# Patient Record
Sex: FEMALE | Race: WHITE | Employment: FULL TIME | URBAN - METROPOLITAN AREA
[De-identification: names, ages, dates, MRNs, and addresses within clinical notes are randomized per-mention and may not be internally consistent; named-entity substitution may affect disease eponyms.]

---

## 2021-01-20 ENCOUNTER — TRANSFERRED RECORDS (OUTPATIENT)
Dept: HEALTH INFORMATION MANAGEMENT | Facility: CLINIC | Age: 61
End: 2021-01-20

## 2021-01-21 ENCOUNTER — TRANSFERRED RECORDS (OUTPATIENT)
Dept: HEALTH INFORMATION MANAGEMENT | Facility: CLINIC | Age: 61
End: 2021-01-21

## 2021-01-22 ENCOUNTER — TELEPHONE (OUTPATIENT)
Dept: MULTI SPECIALTY CLINIC | Facility: CLINIC | Age: 61
End: 2021-01-22

## 2021-01-22 ENCOUNTER — TRANSCRIBE ORDERS (OUTPATIENT)
Dept: OTHER | Age: 61
End: 2021-01-22

## 2021-01-22 ENCOUNTER — DOCUMENTATION ONLY (OUTPATIENT)
Dept: ONCOLOGY | Facility: CLINIC | Age: 61
End: 2021-01-22

## 2021-01-22 DIAGNOSIS — R91.8 LUNG MASS: Primary | ICD-10-CM

## 2021-01-22 DIAGNOSIS — R91.8 MASS OF UPPER LOBE OF RIGHT LUNG: Primary | ICD-10-CM

## 2021-01-22 NOTE — PROGRESS NOTES
Action    Action Taken 1/21/21:    -Spoke w/ [facility] Medical Records - [facility] is a [facility], however pt has not yet signed Care Everywhere Auth. They will fax over Office Notes, Rad reports & labs.    -Spoke w/ Kimberlee in [facility] Rad - they had a disc already burned to send to us, just needed address. Advised Kimberlee I could send a [employer] Shipping Label. Kimberlee advised me that they do not typically utilize [employer] & were unsure how to initiate . Kimberlee advised me they typically use [employer]. I Provided Kimberlee w/ our address,  (attn: to myself.) Kimberlee advised she would also give pt a copy of disc for appt.    -Imaging coming our way detailed below.    -UPS Tracking Number: [id number]  1:32 PM    -1/25/21: Imaging disc from [facility] rec'd - taken for upload  ,2:40 PM       RECORDS STATUS - ALL OTHER DIAGNOSIS      RECORDS RECEIVED FROM: [facility], [facility]   DATE RECEIVED:    NOTES STATUS DETAILS   OFFICE NOTE from referring provider Requested 1/21 Marlyn Arnett PA-C   OFFICE NOTE from medical oncologist     DISCHARGE SUMMARY from hospital     DISCHARGE REPORT from the ER     OPERATIVE REPORT     MEDICATION LIST     CLINICAL TRIAL TREATMENTS TO DATE     LABS     PATHOLOGY REPORTS     ANYTHING RELATED TO DIAGNOSIS Requested 1/21    GENONOMIC TESTING     TYPE:     IMAGING (NEED IMAGES & REPORT)     CT SCANS PACS 1/21/21: [facility]   MRI PACS 1/20/21, 1/16/21: [facility]   MAMMO     ULTRASOUND     PET Requested 2/5 2/4/21: [facility]

## 2021-01-22 NOTE — TELEPHONE ENCOUNTER
Paged by provider RIGOBERTO Reaves at [facility] in regards to this patient and new finding of RUL mass concerning for malignancy. Asking for assistance with coordinating Pulmonary appointment for biopsy evaluation. Patient lives 2.5 hours away so would like to get as much done as possible in as few visits as possible.     Messaged IP clinic schedulers about getting patient in ASAP. Have also ordered a PET scan (not yet done) to be done same day prior to IP visit.     Numbers for Marlyn Arnett:  Cell [phone]  [facility] office [phone]    More Beverly MD on 1/22/2021 at 12:46 PM

## 2021-01-27 ENCOUNTER — TELEPHONE (OUTPATIENT)
Dept: SURGERY | Facility: CLINIC | Age: 61
End: 2021-01-27

## 2021-01-27 NOTE — TELEPHONE ENCOUNTER
Called patient to see if she would like to have the PET Scan done near her. She would like the PET order sent to [facility] in [facility]. Will fax the order today to [phone].

## 2021-02-03 NOTE — TELEPHONE ENCOUNTER
ONCOLOGY INTAKE: Records Information      APPT INFORMATION:  Referring provider: Angeliot Arnett PA-C  Referring provider s clinic: [facility]  Reason for visit/diagnosis:Mass of upper lobe of right lung  Has patient been notified of appointment date and time?: Yes    RECORDS INFORMATION:  Were the records received with the referral (via [employer])? No    Has patient been seen for any external appt for this diagnosis? Yes    If yes, [facility]    Has patient had any imaging or procedures outside of [place] for this condition? Yes      If Yes, [facility]    ADDITIONAL INFORMATION:  PET scheduled for 2/4 with Timothy

## 2021-02-04 ENCOUNTER — TRANSFERRED RECORDS (OUTPATIENT)
Dept: HEALTH INFORMATION MANAGEMENT | Facility: CLINIC | Age: 61
End: 2021-02-04

## 2021-02-10 ENCOUNTER — VIRTUAL VISIT (OUTPATIENT)
Dept: PULMONOLOGY | Facility: CLINIC | Age: 61
End: 2021-02-10
Attending: INTERNAL MEDICINE
Payer: COMMERCIAL

## 2021-02-10 ENCOUNTER — PRE VISIT (OUTPATIENT)
Dept: PULMONOLOGY | Facility: CLINIC | Age: 61
End: 2021-02-10

## 2021-02-10 DIAGNOSIS — R91.8 LUNG MASS: Primary | ICD-10-CM

## 2021-02-10 DIAGNOSIS — C34.91 MALIGNANT NEOPLASM OF RIGHT LUNG, UNSPECIFIED PART OF LUNG (H): ICD-10-CM

## 2021-02-10 PROCEDURE — 99204 OFFICE O/P NEW MOD 45 MIN: CPT | Mod: 95 | Performed by: INTERNAL MEDICINE

## 2021-02-10 RX ORDER — METFORMIN HCL 500 MG
1000 TABLET, EXTENDED RELEASE 24 HR ORAL 2 TIMES DAILY WITH MEALS
COMMUNITY
Start: 2020-08-31 | End: 2021-08-31

## 2021-02-10 RX ORDER — BIOTIN 1 MG
TABLET ORAL
Status: ON HOLD | COMMUNITY
Start: 2019-05-14 | End: 2021-04-05

## 2021-02-10 RX ORDER — HYDROCODONE BITARTRATE AND ACETAMINOPHEN 5; 325 MG/1; MG/1
1 TABLET ORAL
Status: ON HOLD | COMMUNITY
Start: 2021-02-01 | End: 2021-03-17

## 2021-02-10 RX ORDER — ATORVASTATIN CALCIUM 40 MG/1
40 TABLET, FILM COATED ORAL DAILY
COMMUNITY
Start: 2020-09-02 | End: 2021-09-02

## 2021-02-10 RX ORDER — PEN NEEDLE, DIABETIC 31 GX5/16"
NEEDLE, DISPOSABLE MISCELLANEOUS
Status: ON HOLD | COMMUNITY
Start: 2019-05-14 | End: 2021-04-05

## 2021-02-10 RX ORDER — ACETAMINOPHEN 500 MG
500 TABLET ORAL
COMMUNITY
End: 2021-03-29

## 2021-02-10 RX ORDER — IBUPROFEN 200 MG
400 TABLET ORAL EVERY 6 HOURS PRN
COMMUNITY

## 2021-02-10 NOTE — PROGRESS NOTES
"Anya is a 60 year old who is being evaluated via a billable video visit.      How would you like to obtain your AVS? MyChart  If the video visit is dropped, the invitation should be resent by: Text to cell phone: [phone]  Will anyone else be joining your video visit? No      Vitals - Patient Reported  Weight (Patient Reported): 65.8 kg (145 lb)  Height (Patient Reported): 165.1 cm (5' 5\")  BMI (Based on Pt Reported Ht/Wt): 24.13  Pain Score: Moderate Pain (5)  Pain Loc: (PATIENT REPORTS PAIN RIGHT SIDE - UNDER ARM)      I have reviewed and updated patient's allergy and medication list.    Concerns: NONE  Refills: NONE      Mary Lou Ojeda CMA      Video-Visit Details    Type of service:  Video Visit    Video End Time 20 min    Originating Location (pt. Location): Home    Distant Location (provider location):  [facility]     Platform used for Video Visit: [facility]  Interventional Pulmonary Clinic Virtual Visit Note    February 10, 2021    Chief complaint:  Anya Mazariegos is a 60 year old female seen for   Chief Complaint   Patient presents with     Video Visit     RETURN;        Reason for clinic visit / Chief complaint:   Right upper lobe mass    Assessment and Plan:  Right upper lobe mass, likely suggesting primary lung cancer with local lymphangitic spread and questionable chest wall invasion.  PET scan is performed right hilar lymph nodes appear to be positive in my opinion.  We discussed her CT and PET scan findings and further diagnostic techniques, including CT-guided biopsy or bronchoscopic approach.  We will plan to do flexible bronchoscopy with endobronchial ultrasound-guided biopsies.  I discussed pros, cons and potential complications of the procedure and she is in agreement to proceed.  We will schedule her case as soon as possible.  I answered all her questions.    History of Present Illness:  This is a 60 years old woman with no known pulmonary problems " however lifelong smoker, 1 pack/day for 40+ years, no history of exposure.  She has no respiratory symptoms except for occasional cough with no phlegm production.  She did have general anesthesia in the past when she had appendectomy and broken bone surgery.  Her mother had COPD and she was a smoker as well.  She has diabetes which is insulin-dependent.       Allergies   Allergen Reactions     Erythromycin Unknown        Past medical: Insulin dependent DM    Past surgical: broken bone, appendectomy    Social History     Socioeconomic History     Marital status:      Spouse name: Not on file     Number of children: Not on file     Years of education: Not on file     Highest education level: Not on file   Occupational History     Not on file   Social Needs     Financial resource strain: Not on file     Food insecurity     Worry: Not on file     Inability: Not on file     Transportation needs     Medical: Not on file     Non-medical: Not on file   Tobacco Use     Smoking status: Not on file   Substance and Sexual Activity     Alcohol use: Not on file     Drug use: Not on file     Sexual activity: Not on file   Lifestyle     Physical activity     Days per week: Not on file     Minutes per session: Not on file     Stress: Not on file   Relationships     Social connections     Talks on phone: Not on file     Gets together: Not on file     Attends Scientology service: Not on file     Active member of club or organization: Not on file     Attends meetings of clubs or organizations: Not on file     Relationship status: Not on file     Intimate partner violence     Fear of current or ex partner: Not on file     Emotionally abused: Not on file     Physically abused: Not on file     Forced sexual activity: Not on file   Other Topics Concern     Not on file   Social History Narrative     Not on file        Family history: mother w copd      There is no immunization history on file for this patient.    Current Outpatient  Medications   Medication Sig     acetaminophen (TYLENOL) 500 MG tablet Take 500 mg by mouth     atorvastatin (LIPITOR) 40 MG tablet Take 40 mg by mouth     blood glucose (TRUETEST) test strip Test twice daily.     HYDROcodone-acetaminophen (NORCO) 5-325 MG tablet Take 1 tablet by mouth     ibuprofen (ADVIL/MOTRIN) 200 MG tablet Take 400 mg by mouth     insulin glargine (LANTUS SOLOSTAR) 100 UNIT/ML pen Inject 44 Units Subcutaneous     insulin pen needle (MM PEN NEEDLES) 31G X 8 MM miscellaneous Use twice daily.     metFORMIN (GLUCOPHAGE-XR) 500 MG 24 hr tablet Take 1,000 mg by mouth     Semaglutide,0.25 or 0.5MG/DOS, 2 MG/1.5ML SOPN Inject 0.5 mg Subcutaneous     No current facility-administered medications for this visit.         Review of Systems:  I have done 10 points of review systems and all negative except for those mentioned in HPI    Physical examination  Constitutional: Oriented, not in distress  Head and neck: normal posture and movements  Respiratory: Normal tidal breathing, no shortness of breath, no audible wheezing or stridor over the phone or video visit  Integumentary:  No rash on visible skin areas on video visit  Neurological: Alert, orientedx3, no motor deficits  Psychiatric:  Mood and affect are appropriate with insight into her medical condition        MAIN Cote MD

## 2021-02-10 NOTE — LETTER
Date:February 11, 2021      Patient was self referred, no letter generated. Do not send.        [facility] Health Information

## 2021-02-10 NOTE — LETTER
"2/10/2021       RE: Anya Mazariegos  [address]  [place] [facility] [address]     Dear Colleague,    Thank you for referring your patient, Anya Mazariegos, to the [facility] at [facility]. Please see a copy of my visit note below.    Anya is a 60 year old who is being evaluated via a billable video visit.      How would you like to obtain your AVS? MyChart  If the video visit is dropped, the invitation should be resent by: Text to cell phone: [phone]  Will anyone else be joining your video visit? No      Vitals - Patient Reported  Weight (Patient Reported): 65.8 kg (145 lb)  Height (Patient Reported): 165.1 cm (5' 5\")  BMI (Based on Pt Reported Ht/Wt): 24.13  Pain Score: Moderate Pain (5)  Pain Loc: (PATIENT REPORTS PAIN RIGHT SIDE - UNDER ARM)      I have reviewed and updated patient's allergy and medication list.    Concerns: NONE  Refills: NONE      Mary Lou Ojeda CMA      Video-Visit Details    Type of service:  Video Visit    Video End Time 20 min    Originating Location (pt. Location): Home    Distant Location (provider location):  [facility]ER [facility]     Platform used for Video Visit: [facility]  Interventional Pulmonary Clinic Virtual Visit Note    February 10, 2021    Chief complaint:  Anya Mazariegos is a 60 year old female seen for   Chief Complaint   Patient presents with     Video Visit     RETURN;        Reason for clinic visit / Chief complaint:   Right upper lobe mass    Assessment and Plan:  Right upper lobe mass, likely suggesting primary lung cancer with local lymphangitic spread and questionable chest wall invasion.  PET scan is performed right hilar lymph nodes appear to be positive in my opinion.  We discussed her CT and PET scan findings and further diagnostic techniques, including CT-guided biopsy or bronchoscopic approach.  We will plan to do flexible bronchoscopy with endobronchial " ultrasound-guided biopsies.  I discussed pros, cons and potential complications of the procedure and she is in agreement to proceed.  We will schedule her case as soon as possible.  I answered all her questions.    History of Present Illness:  This is a 60 years old woman with no known pulmonary problems however lifelong smoker, 1 pack/day for 40+ years, no history of exposure.  She has no respiratory symptoms except for occasional cough with no phlegm production.  She did have general anesthesia in the past when she had appendectomy and broken bone surgery.  Her mother had COPD and she was a smoker as well.  She has diabetes which is insulin-dependent.       Allergies   Allergen Reactions     Erythromycin Unknown        Past medical: Insulin dependent DM    Past surgical: broken bone, appendectomy    Social History     Socioeconomic History     Marital status:      Spouse name: Not on file     Number of children: Not on file     Years of education: Not on file     Highest education level: Not on file   Occupational History     Not on file   Social Needs     Financial resource strain: Not on file     Food insecurity     Worry: Not on file     Inability: Not on file     Transportation needs     Medical: Not on file     Non-medical: Not on file   Tobacco Use     Smoking status: Not on file   Substance and Sexual Activity     Alcohol use: Not on file     Drug use: Not on file     Sexual activity: Not on file   Lifestyle     Physical activity     Days per week: Not on file     Minutes per session: Not on file     Stress: Not on file   Relationships     Social connections     Talks on phone: Not on file     Gets together: Not on file     Attends Buddhist service: Not on file     Active member of club or organization: Not on file     Attends meetings of clubs or organizations: Not on file     Relationship status: Not on file     Intimate partner violence     Fear of current or ex partner: Not on file      Emotionally abused: Not on file     Physically abused: Not on file     Forced sexual activity: Not on file   Other Topics Concern     Not on file   Social History Narrative     Not on file        Family history: mother w copd      There is no immunization history on file for this patient.    Current Outpatient Medications   Medication Sig     acetaminophen (TYLENOL) 500 MG tablet Take 500 mg by mouth     atorvastatin (LIPITOR) 40 MG tablet Take 40 mg by mouth     blood glucose (TRUETEST) test strip Test twice daily.     HYDROcodone-acetaminophen (NORCO) 5-325 MG tablet Take 1 tablet by mouth     ibuprofen (ADVIL/MOTRIN) 200 MG tablet Take 400 mg by mouth     insulin glargine (LANTUS SOLOSTAR) 100 UNIT/ML pen Inject 44 Units Subcutaneous     insulin pen needle (MM PEN NEEDLES) 31G X 8 MM miscellaneous Use twice daily.     metFORMIN (GLUCOPHAGE-XR) 500 MG 24 hr tablet Take 1,000 mg by mouth     Semaglutide,0.25 or 0.5MG/DOS, 2 MG/1.5ML SOPN Inject 0.5 mg Subcutaneous     No current facility-administered medications for this visit.         Review of Systems:  I have done 10 points of review systems and all negative except for those mentioned in HPI    Physical examination  Constitutional: Oriented, not in distress  Head and neck: normal posture and movements  Respiratory: Normal tidal breathing, no shortness of breath, no audible wheezing or stridor over the phone or video visit  Integumentary:  No rash on visible skin areas on video visit  Neurological: Alert, orientedx3, no motor deficits  Psychiatric:  Mood and affect are appropriate with insight into her medical condition        MAIN Cote MD      Again, thank you for allowing me to participate in the care of your patient.      Sincerely,    Dominick Cote MD

## 2021-02-12 ENCOUNTER — TELEPHONE (OUTPATIENT)
Dept: PULMONOLOGY | Facility: CLINIC | Age: 61
End: 2021-02-12

## 2021-02-12 DIAGNOSIS — Z11.59 ENCOUNTER FOR SCREENING FOR OTHER VIRAL DISEASES: ICD-10-CM

## 2021-02-12 PROBLEM — R91.8 LUNG MASS: Status: ACTIVE | Noted: 2021-02-12

## 2021-02-12 NOTE — TELEPHONE ENCOUNTER
Spoke with patient to schedule procedure with Beto Garibay   Procedure was scheduled on 02/25 at [facility] OR  Patient will have H&P at [facility]     Patient is aware a COVID-19 test is needed before their procedure. The test should be with-in 4 days of their procedure.   Test Details: Date 02/22  Location [facility]    Patient is aware a / is needed day of surgery.   Surgery letter was sent via [employer], patient has my direct contact information for any further questions.

## 2021-02-23 ENCOUNTER — TRANSFERRED RECORDS (OUTPATIENT)
Dept: HEALTH INFORMATION MANAGEMENT | Facility: CLINIC | Age: 61
End: 2021-02-23

## 2021-02-23 LAB
CREAT SERPL-MCNC: 0.57 MG/DL (ref 0.6–1.2)
GFR SERPL CREATININE-BSD FRML MDRD: >60 ML/MIN/1.73M(2)
GLUCOSE SERPL-MCNC: 159 MG/DL (ref 70–105)
HBA1C MFR BLD: 8.6 % (ref 4.3–5.7)
POTASSIUM SERPL-SCNC: 4.5 MMOL/L (ref 3.5–5.1)

## 2021-02-24 ENCOUNTER — ANESTHESIA EVENT (OUTPATIENT)
Dept: SURGERY | Facility: CLINIC | Age: 61
End: 2021-02-24
Payer: COMMERCIAL

## 2021-02-25 ENCOUNTER — HOSPITAL ENCOUNTER (OUTPATIENT)
Facility: CLINIC | Age: 61
Discharge: HOME | End: 2021-02-25
Attending: INTERNAL MEDICINE | Admitting: INTERNAL MEDICINE
Payer: COMMERCIAL

## 2021-02-25 ENCOUNTER — ANESTHESIA (OUTPATIENT)
Dept: SURGERY | Facility: CLINIC | Age: 61
End: 2021-02-25
Payer: COMMERCIAL

## 2021-02-25 VITALS
DIASTOLIC BLOOD PRESSURE: 69 MMHG | TEMPERATURE: 98.3 F | BODY MASS INDEX: 23.14 KG/M2 | RESPIRATION RATE: 16 BRPM | HEART RATE: 74 BPM | HEIGHT: 65 IN | OXYGEN SATURATION: 90 % | SYSTOLIC BLOOD PRESSURE: 126 MMHG | WEIGHT: 138.89 LBS

## 2021-02-25 DIAGNOSIS — R91.8 LUNG MASS: ICD-10-CM

## 2021-02-25 LAB — GLUCOSE BLDC GLUCOMTR-MCNC: 124 MG/DL (ref 70–99)

## 2021-02-25 PROCEDURE — 710N000012 HC RECOVERY PHASE 2, PER MINUTE: Performed by: INTERNAL MEDICINE

## 2021-02-25 PROCEDURE — 88184 FLOWCYTOMETRY/ TC 1 MARKER: CPT | Mod: TC | Performed by: STUDENT IN AN ORGANIZED HEALTH CARE EDUCATION/TRAINING PROGRAM

## 2021-02-25 PROCEDURE — 272N000001 HC OR GENERAL SUPPLY STERILE: Performed by: INTERNAL MEDICINE

## 2021-02-25 PROCEDURE — 82962 GLUCOSE BLOOD TEST: CPT

## 2021-02-25 PROCEDURE — 250N000025 HC SEVOFLURANE, PER MIN: Performed by: INTERNAL MEDICINE

## 2021-02-25 PROCEDURE — 88342 IMHCHEM/IMCYTCHM 1ST ANTB: CPT | Mod: 26 | Performed by: PATHOLOGY

## 2021-02-25 PROCEDURE — 88172 CYTP DX EVAL FNA 1ST EA SITE: CPT | Mod: TC | Performed by: INTERNAL MEDICINE

## 2021-02-25 PROCEDURE — 88189 FLOWCYTOMETRY/READ 16 & >: CPT | Performed by: PATHOLOGY

## 2021-02-25 PROCEDURE — 88172 CYTP DX EVAL FNA 1ST EA SITE: CPT | Mod: 26 | Performed by: PATHOLOGY

## 2021-02-25 PROCEDURE — 88185 FLOWCYTOMETRY/TC ADD-ON: CPT | Mod: TC | Performed by: STUDENT IN AN ORGANIZED HEALTH CARE EDUCATION/TRAINING PROGRAM

## 2021-02-25 PROCEDURE — 88305 TISSUE EXAM BY PATHOLOGIST: CPT | Mod: TC | Performed by: INTERNAL MEDICINE

## 2021-02-25 PROCEDURE — 258N000003 HC RX IP 258 OP 636: Performed by: NURSE ANESTHETIST, CERTIFIED REGISTERED

## 2021-02-25 PROCEDURE — 88341 IMHCHEM/IMCYTCHM EA ADD ANTB: CPT | Mod: TC | Performed by: INTERNAL MEDICINE

## 2021-02-25 PROCEDURE — 88342 IMHCHEM/IMCYTCHM 1ST ANTB: CPT | Mod: TC | Performed by: INTERNAL MEDICINE

## 2021-02-25 PROCEDURE — 999N000141 HC STATISTIC PRE-PROCEDURE NURSING ASSESSMENT: Performed by: INTERNAL MEDICINE

## 2021-02-25 PROCEDURE — 88305 TISSUE EXAM BY PATHOLOGIST: CPT | Mod: 26 | Performed by: PATHOLOGY

## 2021-02-25 PROCEDURE — 999N001137 HC STATISTIC FLOW >15 ABY TC 88189: Performed by: STUDENT IN AN ORGANIZED HEALTH CARE EDUCATION/TRAINING PROGRAM

## 2021-02-25 PROCEDURE — 999N001018 HC STATISTIC H-CELL BLOCK W/STAIN: Performed by: INTERNAL MEDICINE

## 2021-02-25 PROCEDURE — 250N000009 HC RX 250: Performed by: NURSE ANESTHETIST, CERTIFIED REGISTERED

## 2021-02-25 PROCEDURE — 360N000083 HC SURGERY LEVEL 3 W/ FLUORO, PER MIN: Performed by: INTERNAL MEDICINE

## 2021-02-25 PROCEDURE — 88173 CYTOPATH EVAL FNA REPORT: CPT | Mod: TC | Performed by: INTERNAL MEDICINE

## 2021-02-25 PROCEDURE — 710N000010 HC RECOVERY PHASE 1, LEVEL 2, PER MIN: Performed by: INTERNAL MEDICINE

## 2021-02-25 PROCEDURE — 31653 BRONCH EBUS SAMPLNG 3/> NODE: CPT | Mod: GC | Performed by: INTERNAL MEDICINE

## 2021-02-25 PROCEDURE — 88173 CYTOPATH EVAL FNA REPORT: CPT | Mod: 26 | Performed by: PATHOLOGY

## 2021-02-25 PROCEDURE — 370N000017 HC ANESTHESIA TECHNICAL FEE, PER MIN: Performed by: INTERNAL MEDICINE

## 2021-02-25 PROCEDURE — 88341 IMHCHEM/IMCYTCHM EA ADD ANTB: CPT | Mod: 26 | Performed by: PATHOLOGY

## 2021-02-25 PROCEDURE — 250N000011 HC RX IP 250 OP 636: Performed by: NURSE ANESTHETIST, CERTIFIED REGISTERED

## 2021-02-25 RX ORDER — FENTANYL CITRATE 50 UG/ML
25-50 INJECTION, SOLUTION INTRAMUSCULAR; INTRAVENOUS
Status: DISCONTINUED | OUTPATIENT
Start: 2021-02-25 | End: 2021-02-25 | Stop reason: HOSPADM

## 2021-02-25 RX ORDER — SODIUM CHLORIDE, SODIUM LACTATE, POTASSIUM CHLORIDE, CALCIUM CHLORIDE 600; 310; 30; 20 MG/100ML; MG/100ML; MG/100ML; MG/100ML
INJECTION, SOLUTION INTRAVENOUS CONTINUOUS PRN
Status: DISCONTINUED | OUTPATIENT
Start: 2021-02-25 | End: 2021-02-25

## 2021-02-25 RX ORDER — FENTANYL CITRATE 50 UG/ML
25-50 INJECTION, SOLUTION INTRAMUSCULAR; INTRAVENOUS EVERY 5 MIN PRN
Status: DISCONTINUED | OUTPATIENT
Start: 2021-02-25 | End: 2021-02-25 | Stop reason: HOSPADM

## 2021-02-25 RX ORDER — PROPOFOL 10 MG/ML
INJECTION, EMULSION INTRAVENOUS CONTINUOUS PRN
Status: DISCONTINUED | OUTPATIENT
Start: 2021-02-25 | End: 2021-02-25

## 2021-02-25 RX ORDER — ALBUTEROL SULFATE 0.83 MG/ML
2.5 SOLUTION RESPIRATORY (INHALATION) EVERY 4 HOURS PRN
Status: DISCONTINUED | OUTPATIENT
Start: 2021-02-25 | End: 2021-02-25 | Stop reason: HOSPADM

## 2021-02-25 RX ORDER — LIDOCAINE 40 MG/G
CREAM TOPICAL
Status: DISCONTINUED | OUTPATIENT
Start: 2021-02-25 | End: 2021-02-25 | Stop reason: HOSPADM

## 2021-02-25 RX ORDER — ONDANSETRON 4 MG/1
4 TABLET, ORALLY DISINTEGRATING ORAL EVERY 30 MIN PRN
Status: DISCONTINUED | OUTPATIENT
Start: 2021-02-25 | End: 2021-02-25 | Stop reason: HOSPADM

## 2021-02-25 RX ORDER — FENTANYL CITRATE 50 UG/ML
INJECTION, SOLUTION INTRAMUSCULAR; INTRAVENOUS PRN
Status: DISCONTINUED | OUTPATIENT
Start: 2021-02-25 | End: 2021-02-25

## 2021-02-25 RX ORDER — SODIUM CHLORIDE, SODIUM LACTATE, POTASSIUM CHLORIDE, CALCIUM CHLORIDE 600; 310; 30; 20 MG/100ML; MG/100ML; MG/100ML; MG/100ML
INJECTION, SOLUTION INTRAVENOUS CONTINUOUS
Status: DISCONTINUED | OUTPATIENT
Start: 2021-02-25 | End: 2021-02-25 | Stop reason: HOSPADM

## 2021-02-25 RX ORDER — NALOXONE HYDROCHLORIDE 0.4 MG/ML
0.4 INJECTION, SOLUTION INTRAMUSCULAR; INTRAVENOUS; SUBCUTANEOUS
Status: DISCONTINUED | OUTPATIENT
Start: 2021-02-25 | End: 2021-02-25 | Stop reason: HOSPADM

## 2021-02-25 RX ORDER — NALOXONE HYDROCHLORIDE 0.4 MG/ML
0.2 INJECTION, SOLUTION INTRAMUSCULAR; INTRAVENOUS; SUBCUTANEOUS
Status: DISCONTINUED | OUTPATIENT
Start: 2021-02-25 | End: 2021-02-25 | Stop reason: HOSPADM

## 2021-02-25 RX ORDER — PROPOFOL 10 MG/ML
INJECTION, EMULSION INTRAVENOUS PRN
Status: DISCONTINUED | OUTPATIENT
Start: 2021-02-25 | End: 2021-02-25

## 2021-02-25 RX ORDER — ONDANSETRON 2 MG/ML
INJECTION INTRAMUSCULAR; INTRAVENOUS PRN
Status: DISCONTINUED | OUTPATIENT
Start: 2021-02-25 | End: 2021-02-25

## 2021-02-25 RX ORDER — OXYCODONE HYDROCHLORIDE 5 MG/1
5 TABLET ORAL EVERY 4 HOURS PRN
Status: DISCONTINUED | OUTPATIENT
Start: 2021-02-25 | End: 2021-02-25 | Stop reason: HOSPADM

## 2021-02-25 RX ORDER — DEXAMETHASONE SODIUM PHOSPHATE 4 MG/ML
INJECTION, SOLUTION INTRA-ARTICULAR; INTRALESIONAL; INTRAMUSCULAR; INTRAVENOUS; SOFT TISSUE PRN
Status: DISCONTINUED | OUTPATIENT
Start: 2021-02-25 | End: 2021-02-25

## 2021-02-25 RX ORDER — LIDOCAINE HYDROCHLORIDE 10 MG/ML
INJECTION, SOLUTION INFILTRATION; PERINEURAL PRN
Status: DISCONTINUED | OUTPATIENT
Start: 2021-02-25 | End: 2021-02-25

## 2021-02-25 RX ORDER — MEPERIDINE HYDROCHLORIDE 25 MG/ML
12.5 INJECTION INTRAMUSCULAR; INTRAVENOUS; SUBCUTANEOUS
Status: DISCONTINUED | OUTPATIENT
Start: 2021-02-25 | End: 2021-02-25 | Stop reason: HOSPADM

## 2021-02-25 RX ORDER — HYDROMORPHONE HYDROCHLORIDE 1 MG/ML
.3-.5 INJECTION, SOLUTION INTRAMUSCULAR; INTRAVENOUS; SUBCUTANEOUS EVERY 10 MIN PRN
Status: DISCONTINUED | OUTPATIENT
Start: 2021-02-25 | End: 2021-02-25 | Stop reason: HOSPADM

## 2021-02-25 RX ORDER — ACETAMINOPHEN 325 MG/1
975 TABLET ORAL ONCE
Status: DISCONTINUED | OUTPATIENT
Start: 2021-02-25 | End: 2021-02-25 | Stop reason: HOSPADM

## 2021-02-25 RX ORDER — ONDANSETRON 2 MG/ML
4 INJECTION INTRAMUSCULAR; INTRAVENOUS EVERY 30 MIN PRN
Status: DISCONTINUED | OUTPATIENT
Start: 2021-02-25 | End: 2021-02-25 | Stop reason: HOSPADM

## 2021-02-25 RX ADMIN — SUGAMMADEX 200 MG: 100 INJECTION, SOLUTION INTRAVENOUS at 13:38

## 2021-02-25 RX ADMIN — DEXAMETHASONE SODIUM PHOSPHATE 8 MG: 4 INJECTION, SOLUTION INTRA-ARTICULAR; INTRALESIONAL; INTRAMUSCULAR; INTRAVENOUS; SOFT TISSUE at 13:02

## 2021-02-25 RX ADMIN — SODIUM CHLORIDE, POTASSIUM CHLORIDE, SODIUM LACTATE AND CALCIUM CHLORIDE: 600; 310; 30; 20 INJECTION, SOLUTION INTRAVENOUS at 12:48

## 2021-02-25 RX ADMIN — ONDANSETRON 4 MG: 2 INJECTION INTRAMUSCULAR; INTRAVENOUS at 13:02

## 2021-02-25 RX ADMIN — PROPOFOL 180 MG: 10 INJECTION, EMULSION INTRAVENOUS at 12:55

## 2021-02-25 RX ADMIN — FENTANYL CITRATE 100 MCG: 50 INJECTION, SOLUTION INTRAMUSCULAR; INTRAVENOUS at 12:54

## 2021-02-25 RX ADMIN — MIDAZOLAM 2 MG: 1 INJECTION INTRAMUSCULAR; INTRAVENOUS at 12:45

## 2021-02-25 RX ADMIN — PROPOFOL 30 MG: 10 INJECTION, EMULSION INTRAVENOUS at 13:34

## 2021-02-25 RX ADMIN — LIDOCAINE HYDROCHLORIDE 100 MG: 10 INJECTION, SOLUTION INFILTRATION; PERINEURAL at 12:54

## 2021-02-25 RX ADMIN — ROCURONIUM BROMIDE 50 MG: 10 INJECTION INTRAVENOUS at 12:55

## 2021-02-25 RX ADMIN — ROCURONIUM BROMIDE 30 MG: 10 INJECTION INTRAVENOUS at 13:36

## 2021-02-25 RX ADMIN — PROPOFOL 150 MCG/KG/MIN: 10 INJECTION, EMULSION INTRAVENOUS at 13:00

## 2021-02-25 ASSESSMENT — LIFESTYLE VARIABLES: TOBACCO_USE: 1

## 2021-02-25 ASSESSMENT — MIFFLIN-ST. JEOR: SCORE: 1210.88

## 2021-02-25 NOTE — ANESTHESIA POSTPROCEDURE EVALUATION
Patient: Anya Mazariegos    Procedure(s):  BRONCHOSCOPY, DIAGNOSTIC, endobronchial ultrasound, transbronchial biopsies    Diagnosis:Lung mass [R91.8]  Diagnosis Additional Information: No value filed.    Anesthesia Type:  General    Note:  Disposition: Outpatient   Postop Pain Control: Uneventful            Sign Out: Well controlled pain   PONV: No   Neuro/Psych: Uneventful            Sign Out: Acceptable/Baseline neuro status   Airway/Respiratory: Uneventful            Sign Out: Acceptable/Baseline resp. status   CV/Hemodynamics: Uneventful            Sign Out: Acceptable CV status   Other NRE: NONE   DID A NON-ROUTINE EVENT OCCUR? No         Last vitals:  Vitals:    02/25/21 1501 02/25/21 1515 02/25/21 1521   BP: 122/68 126/69 126/69   Pulse: 79 84 74   Resp: 16  16   Temp: 36.8  C (98.2  F)  36.8  C (98.3  F)   SpO2: 96% 90%        Last vitals prior to Anesthesia Care Transfer:  CRNA VITALS  2/25/2021 1321 - 2/25/2021 1421      2/25/2021             EKG:  Sinus rhythm          Electronically Signed By: Christopher J. Behrens, MD  February 25, 2021  3:27 PM

## 2021-02-25 NOTE — DISCHARGE INSTRUCTIONS
Post Bronchoscopy Patient Instructions:    February 25, 2021  Anya Mazariegos    Your procedure was completed (bronchoscopy with biopsies) without any immediate complications.    You may cough up scant amount of blood for the next 12 hours. If you have excessive cough with blood, chest pain, shortness of breath, please report to the closest emergency room.    You may experience low grade (less than 100.5 F) fever next 24 hours, if so you can take tylenol. If the fever persists more than 24 hours contact to our office or your primary care provider.    Our office (Thoracic/Pulmonary--[phone]) will call you as soon as the results of biopsies are ready.    May resume your regular diet as it was prior to procedure.    Should you have any question, please do not hesitate to call our office.    [facility], [place]  Same-Day Surgery   Adult Discharge Orders & Instructions     For 24 hours after surgery    1. Get plenty of rest.  A responsible adult must stay with you for at least 24 hours after you leave the hospital.   2. Do not drive or use heavy equipment.  If you have weakness or tingling, don't drive or use heavy equipment until this feeling goes away.  3. Do not drink alcohol.  4. Avoid strenuous or risky activities.  Ask for help when climbing stairs.   5. You may feel lightheaded.  IF so, sit for a few minutes before standing.  Have someone help you get up.   6. If you have nausea (feel sick to your stomach): Drink only clear liquids such as apple juice, ginger ale, broth or 7-Up.  Rest may also help.  Be sure to drink enough fluids.  Move to a regular diet as you feel able.  7. You may have a slight fever. Call the doctor if your fever is over 100 F (37.7 C) (taken under the tongue) or lasts longer than 24 hours.  8. You may have a dry mouth, a sore throat, muscle aches or trouble sleeping.  These should go away after 24 hours.  9. Do not make important or legal decisions.   Call  your doctor for any of the followin.  Signs of infection (fever, growing tenderness at the surgery site, a large amount of drainage or bleeding, severe pain, foul-smelling drainage, redness, swelling).    2. It has been over 8 to 10 hours since surgery and you are still not able to urinate (pass water).    3.  Headache for over 24 hours.    4.  Numbness, tingling or weakness the day after surgery (if you had spinal anesthesia).  To contact a doctor, call Dr. Beto Garibay  at  or:        [phone] and ask for the resident on call for   Pulmonology (answered 24 hours a day)      Emergency Department:    [facility]: [phone]       (TTY for hearing impaired: [phone])    [facility]: [phone]       (TTY for hearing impaired: [phone])

## 2021-02-25 NOTE — ANESTHESIA PROCEDURE NOTES
Airway   Date/Time: 2/25/2021 12:57 PM   Patient location during procedure: OR  Staff -   CRNA: Jonna Arnett APRN CRNA  Performed By: CRNA    Consent for Airway   Urgency: elective    Indications and Patient Condition  Indications for airway management: oj-procedural  Induction type:intravenousMask difficulty assessment: 1 - vent by mask    Final Airway Details  Final airway type: endotracheal airway  Successful airway:ETT - single and Oral  Endotracheal Airway Details   ETT size (mm): 8.5  Cuffed: yes  Successful intubation technique: direct laryngoscopy  Grade View of Cords: 2  Adjucts: stylet  Measured from: gums/teeth  Secured at (cm): 22  Secured with: pink tape  Bite block used: None    Post intubation assessment   Placement verified by: capnometry, equal breath sounds and chest rise   Number of attempts at approach: 1  Secured with:pink tape  Ease of procedure: easy  Dentition: Intact and Unchanged

## 2021-02-25 NOTE — ANESTHESIA CARE TRANSFER NOTE
Patient: Anya Mazariegos    Procedure(s):  BRONCHOSCOPY, DIAGNOSTIC, endobronchial ultrasound, transbronchial biopsies    Diagnosis: Lung mass [R91.8]  Diagnosis Additional Information: No value filed.    Anesthesia Type:   General     Note:    Oropharynx: oropharynx clear of all foreign objects and spontaneously breathing  Level of Consciousness: awake  Oxygen Supplementation: nasal cannula  Level of Supplemental Oxygen (L/min / FiO2): 4  Independent Airway: airway patency satisfactory and stable  Dentition: dentition unchanged  Vital Signs Stable: post-procedure vital signs reviewed and stable  Report to RN Given: handoff report given  Patient transferred to: PACU    Handoff Report: Identifed the Patient, Identified the Reponsible Provider, Reviewed the pertinent medical history, Discussed the surgical course, Reviewed Intra-OP anesthesia mangement and issues during anesthesia, Set expectations for post-procedure period and Allowed opportunity for questions and acknowledgement of understanding      Vitals: (Last set prior to Anesthesia Care Transfer)  CRNA VITALS  2/25/2021 1321 - 2/25/2021 1356      2/25/2021             EKG:  Sinus rhythm        Electronically Signed By: DARA Jaime CRNA  February 25, 2021  1:56 PM

## 2021-02-25 NOTE — ANESTHESIA PREPROCEDURE EVALUATION
Anesthesia Pre-Procedure Evaluation    Patient: Anya Mazariegos   MRN: [id number] : 1960        Preoperative Diagnosis: Lung mass [R91.8]   Procedure : Procedure(s):  BRONCHOSCOPY, DIAGNOSTIC, endobronchial ultrasound, transbronchial biopsies     Past Medical History:   Diagnosis Date     Diabetes (H)       Past Surgical History:   Procedure Laterality Date     ORTHOPEDIC SURGERY        Allergies   Allergen Reactions     Erythromycin Nausea and Vomiting      Social History     Tobacco Use     Smoking status: Current Some Day Smoker     Types: Cigarettes     Smokeless tobacco: Never Used   Substance Use Topics     Alcohol use: Yes     Comment: rare      Wt Readings from Last 1 Encounters:   No data found for Wt        This is a 60 years old woman with no known pulmonary problems however lifelong smoker, 1 pack/day for 40+ years, no history of exposure.  She has no respiratory symptoms except for occasional cough with no phlegm production.  She did have general anesthesia in the past when she had appendectomy and broken bone surgery.  Her mother had COPD and she was a smoker as well.  She has diabetes which is insulin-dependent.    Anesthesia Evaluation            ROS/MED HX  ENT/Pulmonary:     (+) tobacco use, Current use, 1 packs/day, 40  Pack-Year Hx,      Neurologic:       Cardiovascular:    (-) murmur and wheezes   METS/Exercise Tolerance:     Hematologic:       Musculoskeletal:       GI/Hepatic:       Renal/Genitourinary:       Endo:     (+) type II DM, Using insulin,     Psychiatric/Substance Use:       Infectious Disease:       Malignancy:       Other:            Physical Exam    Airway        Mallampati: II   TM distance: > 3 FB   Neck ROM: full   Mouth opening: > 3 cm    Respiratory Devices and Support         Dental  no notable dental history         Cardiovascular          Rhythm and rate: regular and normal (-) no systolic click and no murmur    Pulmonary   pulmonary exam normal        breath  sounds clear to auscultation   (+) decreased breath sounds   (-) no wheezes        OUTSIDE LABS:  CBC: No results found for: WBC, HGB, HCT, PLT  BMP: No results found for: NA, POTASSIUM, CHLORIDE, CO2, BUN, CR, GLC  COAGS: No results found for: PTT, INR, FIBR  POC: No results found for: BGM, HCG, HCGS  HEPATIC: No results found for: ALBUMIN, PROTTOTAL, ALT, AST, GGT, ALKPHOS, BILITOTAL, BILIDIRECT, ELICIA  OTHER: No results found for: PH, LACT, A1C, ILIANA, PHOS, MAG, LIPASE, AMYLASE, TSH, T4, T3, CRP, SED    Anesthesia Plan    ASA Status:  2   NPO Status:  NPO Appropriate    Anesthesia Type: General.     - Airway: ETT   Induction: Intravenous.   Maintenance: Inhalation.        Consents    Anesthesia Plan(s) and associated risks, benefits, and realistic alternatives discussed. Questions answered and patient/representative(s) expressed understanding.     - Discussed with:  Patient      - Extended Intubation/Ventilatory Support Discussed: Extended Intubation.      - Patient is DNR/DNI Status: No    Use of blood products discussed: Yes.     - Discussed with: Patient.     Postoperative Care    Pain management: IV analgesics.   PONV prophylaxis: Ondansetron (or other 5HT-3), Dexamethasone or Solumedrol     Comments:    8.5 ETT            Christopher J. Behrens, MD

## 2021-02-25 NOTE — PROCEDURES
INTERVENTIONAL PULMONOLOGY       Procedure(s):    A flexible bronchoscopy  Airway exam  EBUS-TBNA (3 sites)  Therapeutic suctioning (1 sites)    Indication:  RUL mass     Attending of Record:  Beto Garibay MD    Interventional Pulmonary Fellow   Roxanne Ryan MD     Trainees Present:   None     Medications:    General Anesthesia - See anesthesia flowsheet for details    Sedation Time:   Per Anesthesia Care Provider    Time Out:  Performed    The patient's medical record has been reviewed.  The indication for the procedure was reviewed.  The necessary history and physical examination was performed and reviewed.  The risks, benefits and alternatives of the procedure were discussed with the the patient in detail and she had the opportunity to ask questions. All questions were answered to the best of my ability.  Verbal and written informed consent was obtained.  The proposed procedure and the patient's identification were verified prior to the procedure by the physician and the surgical team.    After clinical evaluation and reviewing the indication, risks, alternatives and benefits of the procedure the patient was deemed to be in satisfactory condition to undergo the procedure.      A Tuberculosis risk assessment was performed:  The patient has no known RISK of Tuberculosis    The procedure was performed in a negative airflow room: The patient could not be moved to a negative airflow room because of needed OR for the procedure    Maneuvers / Procedure:      Airway Examination: A complete airway examination was performed from the distal trachea to the subsegmental level in each lobe of both lungs.  Pertinent findings include: No endobronchial lesion.         EBUS-TBNA: The EBUS scope was inserted and biopsies were obtained from   Station 7 with 4 passes, 4 samples obtained with DANIEL present, a combination of suction and no suction was used to obtain the samples  Station 4R with 10 passes, 10 samples obtained  with DANIEL present, a combination of suction and no suction was used to obtain the samples  Station 11R with 3 passes, 3 samples obtained with DANIEL present, a combination of suction and no suction was used to obtain the samples. EBUS samples were sent for cytology.                                       Station 11L and 4L were examined but too small to biopsy.    Any disposable equipment was visually inspected and deemed to be intact immediately post procedure.      Relevant Pictures: None    Recommendations:     --Followup biopsy results.    Roxanne Ryan  Interventional Pulmonary Fellow  [phone]

## 2021-02-25 NOTE — BRIEF OP NOTE
[facility]    Brief Operative Note    Pre-operative diagnosis: Lung mass [R91.8]  Post-operative diagnosis Same as pre-operative diagnosis    Procedure: Procedure(s):  BRONCHOSCOPY, DIAGNOSTIC, endobronchial ultrasound, transbronchial biopsies  Surgeon: Surgeon(s) and Role:     * Beto Garibay MD - Primary     * Roxanne Ryan MD - Fellow - Assisting  Anesthesia: General   Estimated blood loss: Minimal  Drains: None  Specimens:   ID Type Source Tests Collected by Time Destination   A : Station 7, 4R, 11R Other (specify in comments) Lymph Node FINE NEEDLE ASPIRATION Beto Garibay MD 2/25/2021  1:14 PM      Findings:   Mediastinal lymphadenopathy  Complications: None.  Implants: * No implants in log *

## 2021-02-26 LAB — COPATH REPORT: NORMAL

## 2021-03-01 LAB — COPATH REPORT: NORMAL

## 2021-03-02 ENCOUNTER — TELEPHONE (OUTPATIENT)
Dept: SURGERY | Facility: CLINIC | Age: 61
End: 2021-03-02

## 2021-03-02 DIAGNOSIS — R91.8 LUNG MASS: Primary | ICD-10-CM

## 2021-03-02 NOTE — PROGRESS NOTES
I called Anya to discuss the results from her recent bronchoscopy/EBUS procedure with Dr. Garibay.   Both FNA and flow cytometry were negative, even though DANIEL saw some concerning cells during procedure.    I was asked to have Interventional Radiology review to determine if they could do a CT guided percutaneous needle biopsy of the right lung mass.   This is pending review and reply.       3/2/21    IR responded and are able to arrange this biopsy.   Will let patient know plan for 3/17 arrival at 8am to [facility] Waiting Room.

## 2021-03-02 NOTE — TELEPHONE ENCOUNTER
I called Anya to discuss the final pathology from her recent bronchoscopy/EBUS with Dr. Garibay.   The lymph nodes sampled were negative for malignancy.   I had to leave a VM telling her this and that I would call her again tomorrow to discuss IR biopsy requested and to answer her questions.  I left my direct # for her to call, as needed.    Addendum:  I was notified by IR that a biopsy was scheduled 3/17 @ 8am, check in time for 9:30am proc. Pt to check in at [facility].   I tried again to reach Anya and had to leave another VM with this information.   I gave her my direct # again to call and let me know if there was a better # or way to reach her to discuss this further.

## 2021-03-02 NOTE — PROGRESS NOTES
Patient referred to interventional radiology by:   Rosa Pandey APRN CNS  Diagnoses: Lung mass    ===    Referral placed to review for RUL mass biopsy.  Bronch/EBUS was negative.     See imaging 2/4/2021 [facility]        ===    Review of Epic entered chart data shows the patient is on no anticoagulation    Platelets = none reported  INR = none reported     COVID-19 PCR results = none recent    ===    Case request and imaging reviewed with IR Dr. Bunny Delcid. Approved for CT-guided biopsy of large RIGHT lung mass.    JULES Mancuso, PALorieC

## 2021-03-04 DIAGNOSIS — Z11.59 ENCOUNTER FOR SCREENING FOR OTHER VIRAL DISEASES: ICD-10-CM

## 2021-03-09 ENCOUNTER — TELEPHONE (OUTPATIENT)
Dept: SURGERY | Facility: CLINIC | Age: 61
End: 2021-03-09

## 2021-03-09 NOTE — TELEPHONE ENCOUNTER
I had a VM left by Anya saying the scheduled IR biopsy date should work for her and she would try to keep her phone handy and ringer turned up so she doesn't miss a call.

## 2021-03-11 ENCOUNTER — TELEPHONE (OUTPATIENT)
Dept: SURGERY | Facility: CLINIC | Age: 61
End: 2021-03-11

## 2021-03-11 NOTE — TELEPHONE ENCOUNTER
I called Anya to answer some of her questions about the planned IR biopsy scheduled 3/17.   I told her that a nurse from Interventional Radiology will call a few days prior to go over things in more detail, where to check in, time of arrival and whether a family member can accompany her into the hospital.   I gave her the IR # in case she misses a call.   I reviewed her medications again with her and she is not taking any blood thinners.       She appreciated my call.

## 2021-03-15 ENCOUNTER — TELEPHONE (OUTPATIENT)
Dept: INTERVENTIONAL RADIOLOGY/VASCULAR | Facility: CLINIC | Age: 61
End: 2021-03-15

## 2021-03-16 ENCOUNTER — DOCUMENTATION ONLY (OUTPATIENT)
Dept: ONCOLOGY | Facility: CLINIC | Age: 61
End: 2021-03-16

## 2021-03-16 NOTE — PROGRESS NOTES
Action    Action Taken 3/16/2021 1:13PM     I emailed records from [facility] to HIM

## 2021-03-17 ENCOUNTER — HOSPITAL ENCOUNTER (OUTPATIENT)
Facility: CLINIC | Age: 61
Discharge: HOME | End: 2021-03-17
Attending: INTERNAL MEDICINE | Admitting: RADIOLOGY
Payer: COMMERCIAL

## 2021-03-17 ENCOUNTER — APPOINTMENT (OUTPATIENT)
Dept: MEDSURG UNIT | Facility: CLINIC | Age: 61
End: 2021-03-17
Attending: INTERNAL MEDICINE
Payer: COMMERCIAL

## 2021-03-17 ENCOUNTER — APPOINTMENT (OUTPATIENT)
Dept: INTERVENTIONAL RADIOLOGY/VASCULAR | Facility: CLINIC | Age: 61
End: 2021-03-17
Attending: PHYSICIAN ASSISTANT
Payer: COMMERCIAL

## 2021-03-17 ENCOUNTER — APPOINTMENT (OUTPATIENT)
Dept: GENERAL RADIOLOGY | Facility: CLINIC | Age: 61
End: 2021-03-17
Attending: RADIOLOGY
Payer: COMMERCIAL

## 2021-03-17 VITALS
RESPIRATION RATE: 16 BRPM | OXYGEN SATURATION: 96 % | HEART RATE: 84 BPM | DIASTOLIC BLOOD PRESSURE: 63 MMHG | SYSTOLIC BLOOD PRESSURE: 111 MMHG | TEMPERATURE: 98.2 F

## 2021-03-17 DIAGNOSIS — R91.8 LUNG MASS: ICD-10-CM

## 2021-03-17 DIAGNOSIS — R91.8 LUNG MASS: Primary | ICD-10-CM

## 2021-03-17 LAB
BASOPHILS # BLD AUTO: 0 10E9/L (ref 0–0.2)
BASOPHILS NFR BLD AUTO: 0.4 %
DIFFERENTIAL METHOD BLD: ABNORMAL
EOSINOPHIL # BLD AUTO: 0 10E9/L (ref 0–0.7)
EOSINOPHIL NFR BLD AUTO: 0.2 %
ERYTHROCYTE [DISTWIDTH] IN BLOOD BY AUTOMATED COUNT: 14.3 % (ref 10–15)
HCT VFR BLD AUTO: 31.6 % (ref 35–47)
HGB BLD-MCNC: 9.9 G/DL (ref 11.7–15.7)
IMM GRANULOCYTES # BLD: 0 10E9/L (ref 0–0.4)
IMM GRANULOCYTES NFR BLD: 0.4 %
INR PPP: 1.1 (ref 0.86–1.14)
LYMPHOCYTES # BLD AUTO: 1.3 10E9/L (ref 0.8–5.3)
LYMPHOCYTES NFR BLD AUTO: 11.3 %
MCH RBC QN AUTO: 24.7 PG (ref 26.5–33)
MCHC RBC AUTO-ENTMCNC: 31.3 G/DL (ref 31.5–36.5)
MCV RBC AUTO: 79 FL (ref 78–100)
MONOCYTES # BLD AUTO: 1 10E9/L (ref 0–1.3)
MONOCYTES NFR BLD AUTO: 8.6 %
NEUTROPHILS # BLD AUTO: 8.8 10E9/L (ref 1.6–8.3)
NEUTROPHILS NFR BLD AUTO: 79.1 %
NRBC # BLD AUTO: 0 10*3/UL
NRBC BLD AUTO-RTO: 0 /100
PLATELET # BLD AUTO: 493 10E9/L (ref 150–450)
RBC # BLD AUTO: 4.01 10E12/L (ref 3.8–5.2)
WBC # BLD AUTO: 11.2 10E9/L (ref 4–11)

## 2021-03-17 PROCEDURE — 88342 IMHCHEM/IMCYTCHM 1ST ANTB: CPT | Mod: 26 | Performed by: PATHOLOGY

## 2021-03-17 PROCEDURE — 88313 SPECIAL STAINS GROUP 2: CPT | Mod: 26 | Performed by: PATHOLOGY

## 2021-03-17 PROCEDURE — 32408 CORE NDL BX LNG/MED PERQ: CPT | Performed by: RADIOLOGY

## 2021-03-17 PROCEDURE — 85610 PROTHROMBIN TIME: CPT | Performed by: PHYSICIAN ASSISTANT

## 2021-03-17 PROCEDURE — 99152 MOD SED SAME PHYS/QHP 5/>YRS: CPT

## 2021-03-17 PROCEDURE — 999N000065 XR CHEST 1 VW

## 2021-03-17 PROCEDURE — 88360 TUMOR IMMUNOHISTOCHEM/MANUAL: CPT | Mod: 26 | Performed by: PATHOLOGY

## 2021-03-17 PROCEDURE — 88341 IMHCHEM/IMCYTCHM EA ADD ANTB: CPT | Mod: TC | Performed by: PHYSICIAN ASSISTANT

## 2021-03-17 PROCEDURE — 71045 X-RAY EXAM CHEST 1 VIEW: CPT | Mod: 26 | Performed by: RADIOLOGY

## 2021-03-17 PROCEDURE — 272N000505 HC NEEDLE CR5

## 2021-03-17 PROCEDURE — 88342 IMHCHEM/IMCYTCHM 1ST ANTB: CPT | Mod: TC | Performed by: PHYSICIAN ASSISTANT

## 2021-03-17 PROCEDURE — 88305 TISSUE EXAM BY PATHOLOGIST: CPT | Mod: TC | Performed by: PHYSICIAN ASSISTANT

## 2021-03-17 PROCEDURE — 88341 IMHCHEM/IMCYTCHM EA ADD ANTB: CPT | Mod: 26 | Performed by: PATHOLOGY

## 2021-03-17 PROCEDURE — 272N000506 HC NEEDLE CR6

## 2021-03-17 PROCEDURE — 88333 PATH CONSLTJ SURG CYTO XM 1: CPT | Mod: 26 | Performed by: PATHOLOGY

## 2021-03-17 PROCEDURE — 88360 TUMOR IMMUNOHISTOCHEM/MANUAL: CPT | Mod: TC | Performed by: PHYSICIAN ASSISTANT

## 2021-03-17 PROCEDURE — 250N000011 HC RX IP 250 OP 636: Performed by: RADIOLOGY

## 2021-03-17 PROCEDURE — 32408 CORE NDL BX LNG/MED PERQ: CPT

## 2021-03-17 PROCEDURE — 88313 SPECIAL STAINS GROUP 2: CPT | Mod: TC | Performed by: PHYSICIAN ASSISTANT

## 2021-03-17 PROCEDURE — 88333 PATH CONSLTJ SURG CYTO XM 1: CPT | Mod: TC | Performed by: PHYSICIAN ASSISTANT

## 2021-03-17 PROCEDURE — 999N000133 HC STATISTIC PP CARE STAGE 2

## 2021-03-17 PROCEDURE — 88305 TISSUE EXAM BY PATHOLOGIST: CPT | Mod: 26 | Performed by: PATHOLOGY

## 2021-03-17 PROCEDURE — 85025 COMPLETE CBC W/AUTO DIFF WBC: CPT | Performed by: PHYSICIAN ASSISTANT

## 2021-03-17 PROCEDURE — 99152 MOD SED SAME PHYS/QHP 5/>YRS: CPT | Performed by: RADIOLOGY

## 2021-03-17 PROCEDURE — 999N001020 HC STATISTIC H-SEND OUTS PREP: Performed by: PHYSICIAN ASSISTANT

## 2021-03-17 PROCEDURE — 250N000009 HC RX 250: Performed by: RADIOLOGY

## 2021-03-17 PROCEDURE — 258N000003 HC RX IP 258 OP 636: Performed by: PHYSICIAN ASSISTANT

## 2021-03-17 RX ORDER — MORPHINE SULFATE 15 MG/1
15 TABLET, FILM COATED, EXTENDED RELEASE ORAL EVERY 8 HOURS
COMMUNITY
End: 2021-03-29

## 2021-03-17 RX ORDER — NALOXONE HYDROCHLORIDE 0.4 MG/ML
0.4 INJECTION, SOLUTION INTRAMUSCULAR; INTRAVENOUS; SUBCUTANEOUS
Status: DISCONTINUED | OUTPATIENT
Start: 2021-03-17 | End: 2021-03-17 | Stop reason: HOSPADM

## 2021-03-17 RX ORDER — NALOXONE HYDROCHLORIDE 0.4 MG/ML
0.2 INJECTION, SOLUTION INTRAMUSCULAR; INTRAVENOUS; SUBCUTANEOUS
Status: DISCONTINUED | OUTPATIENT
Start: 2021-03-17 | End: 2021-03-17 | Stop reason: HOSPADM

## 2021-03-17 RX ORDER — OXYCODONE HYDROCHLORIDE 5 MG/1
1-2 CAPSULE ORAL EVERY 4 HOURS PRN
COMMUNITY

## 2021-03-17 RX ORDER — FLUMAZENIL 0.1 MG/ML
0.2 INJECTION, SOLUTION INTRAVENOUS
Status: DISCONTINUED | OUTPATIENT
Start: 2021-03-17 | End: 2021-03-17 | Stop reason: HOSPADM

## 2021-03-17 RX ORDER — SODIUM CHLORIDE 9 MG/ML
INJECTION, SOLUTION INTRAVENOUS CONTINUOUS
Status: DISCONTINUED | OUTPATIENT
Start: 2021-03-17 | End: 2021-03-17 | Stop reason: HOSPADM

## 2021-03-17 RX ORDER — FENTANYL CITRATE 50 UG/ML
25-50 INJECTION, SOLUTION INTRAMUSCULAR; INTRAVENOUS EVERY 5 MIN PRN
Status: DISCONTINUED | OUTPATIENT
Start: 2021-03-17 | End: 2021-03-17 | Stop reason: HOSPADM

## 2021-03-17 RX ORDER — LIDOCAINE 40 MG/G
CREAM TOPICAL
Status: DISCONTINUED | OUTPATIENT
Start: 2021-03-17 | End: 2021-03-17 | Stop reason: HOSPADM

## 2021-03-17 RX ORDER — CYCLOBENZAPRINE HCL 5 MG
5 TABLET ORAL 3 TIMES DAILY PRN
Status: ON HOLD | COMMUNITY
End: 2021-03-17

## 2021-03-17 RX ADMIN — LIDOCAINE HYDROCHLORIDE 3 ML: 10 INJECTION, SOLUTION EPIDURAL; INFILTRATION; INTRACAUDAL; PERINEURAL at 10:29

## 2021-03-17 RX ADMIN — SODIUM CHLORIDE: 9 INJECTION, SOLUTION INTRAVENOUS at 09:15

## 2021-03-17 RX ADMIN — FENTANYL CITRATE 50 MCG: 50 INJECTION, SOLUTION INTRAMUSCULAR; INTRAVENOUS at 10:26

## 2021-03-17 RX ADMIN — MIDAZOLAM 1 MG: 1 INJECTION INTRAMUSCULAR; INTRAVENOUS at 10:26

## 2021-03-17 RX ADMIN — FENTANYL CITRATE 25 MCG: 50 INJECTION, SOLUTION INTRAMUSCULAR; INTRAVENOUS at 10:33

## 2021-03-17 RX ADMIN — MIDAZOLAM 0.5 MG: 1 INJECTION INTRAMUSCULAR; INTRAVENOUS at 10:33

## 2021-03-17 NOTE — DISCHARGE INSTRUCTIONS
[facility]    Interventional Radiology  Patient Instructions Following Lung Biopsy    AFTER YOU GO HOME  ? If you were given sedation DO NOT drive or operate machinery at home or at work for at least 24 hours  ? DO relax and take it easy for 48 hours, no strenuous activity for 24 hours  ? DO drink plenty of fluids  ? DO resume your regular diet, unless otherwise instructed by your Primary Physician  ? Keep the dressing dry and in place for 24 hours.  ? DO NOT SMOKE FOR AT LEAST 24 HOURS, if you have been given any medications that were to help you relax or sedate you during your procedure  ? DO NOT drink alcoholic beverages the day of your procedure  ? DO NOT do any strenuous exercise or lifting (> 10 lbs) for at least 3 days following your procedure  ? DO NOT take a bath or shower for at least 12 hours following your procedure  ? Remove dressing after shower the next day. Replace with Band aid for 2 days.  Never leave a wet dressing in place.  ? DO NOT make any important or legal decisions for 24 hours following your procedure  ? There should be minimum drainage from the biopsy site    CALL THE PHYSICIAN IF:  ? You start bleeding from the procedure site.  If you do start to bleed from that site,  hold pressure on the site for a minimum of 10 minutes.  Your physician will tell you if you need to return to the hospital  ? You develop nausea or vomiting  ? You have excessive swelling, redness, or tenderness at the site  ? You have drainage that looks like it is infected.  ? You experience severe pain  ? You develop hives or a rash or unexplained itching  ? You develop shortness of breath  ? You develop a temperature of 101 degrees F or greater  ? You develop chest pain or cough up blood, lightheadedness or fainting    ADDITIONAL INSTRUCTIONS: none    [facility] INTERVENTIONAL RADIOLOGY DEPARTMENT  Procedure Physician:         Dr. Vazquez           Date of procedure: March 17, 2021  Telephone  Numbers: [phone] Monday-Friday 8:00 am to 4:30 pm  [phone] After 4:30 pm Monday-Friday, Weekends & Holidays.   Ask for the Interventional Radiologist on call.  Someone is on call 24 hrs/day  [facility] toll free number: [phone] Monday-Friday 8:00 am to 4:30 pm  [facility] Emergency Dept: [phone]

## 2021-03-17 NOTE — PROGRESS NOTES
Pt arrived on 2a post lung biopsy. VSS Ra. Dressing c/d/i. No pain at site. Pt eating and drinking.

## 2021-03-17 NOTE — PROCEDURES
[facility] Brief Procedure Note    Pre-operative diagnosis: Right apical lung mass   Post-operative diagnosis Right apical lung mass   Procedure: CT guided right apical lung mass percutaneous biopsy     Surgeon: Kimberlee Galloway MD   Assistants(s): -   Estimated blood loss: None        Findings: 4 x 18 G cores taken via 17 G coaxial needle of periphery of right apical lung mass via right posterior intercostal approach, due to large area of central necrosis.  Attending pathology service confirmed the samples were adequate.   Complications: None.

## 2021-03-17 NOTE — IP AVS SNAPSHOT
[facility] Interventional Radiology  [address] [address]  Phone: [phone]                                    After Visit Summary   3/17/2021    Anya Mazariegos    MRN: [id number]           After Visit Summary Signature Page    I have received my discharge instructions, and my questions have been answered. I have discussed any challenges I see with this plan with the nurse or doctor.    ..........................................................................................................................................  Patient/Patient Representative Signature      ..........................................................................................................................................  Patient Representative Print Name and Relationship to Patient    ..................................................               ................................................  Date                                   Time    ..........................................................................................................................................  Reviewed by Signature/Title    ...................................................              ..............................................  Date                                               Time          22EPIC Rev 08/18

## 2021-03-17 NOTE — IR NOTE
Patient Name: Anya Mazariegos  Medical Record Number: [id number]  Today's Date: 3/17/2021    Procedure: Right lung biopsy  Proceduralist: Dr. Greenberg    Procedure Start: 1026  Procedure end: 1038  Sedation medications administered: 75 mcg fentanyl and 1.5 mg versed     Report given to: Agnes HOLLINGSWORTH 2A  : HARI    Other Notes: Pt arrived to IR room CT 2 from . Consent reviewed. Pt denies any questions or concerns regarding procedure. Pt positioned prone and monitored per protocol. Pt tolerated procedure without any noted complications. Pt transferred back to .

## 2021-03-17 NOTE — PRE-PROCEDURE
GENERAL PRE-PROCEDURE:   Procedure:  Lung biopsy  Date/Time:  3/17/2021 9:37 AM    Written consent obtained?: Yes    Risks and benefits: Risks, benefits and alternatives were discussed    Consent given by:  Patient  Patient states understanding of procedure being performed: Yes    Patient's understanding of procedure matches consent: Yes    Procedure consent matches procedure scheduled: Yes    Appropriately NPO:  Yes  ASA Class:  Class 2- mild systemic disease, no acute problems, no functional limitations  Mallampati  :  Grade 2- soft palate, base of uvula, tonsillar pillars, and portion of posterior pharyngeal wall visible  Lungs:  Other (comment)  Heart:  Other (comment)  History & Physical reviewed:  History and physical reviewed and no updates needed  Statement of review:  I have reviewed the lab findings, diagnostic data, medications, and the plan for sedation

## 2021-03-17 NOTE — PROGRESS NOTES
1145 Chest xray completed, read by Dr. Lucero, received order for pt to discharge after 2 hour recovery. 1230 Pt tolerated oral intake. Discharge instructions reviewed, copy given to pt. Pt tolerated ambulation. Voided. THADDEUS bravo'd. Pt discharged home accompanied by daughters.

## 2021-03-18 ENCOUNTER — PRE VISIT (OUTPATIENT)
Dept: ONCOLOGY | Facility: CLINIC | Age: 61
End: 2021-03-18

## 2021-03-18 NOTE — TELEPHONE ENCOUNTER
ONCOLOGY INTAKE: Records Information      APPT INFORMATION:  Referring provider:  Dr. Cote  Referring provider s clinic:  [facility]  Reason for visit/diagnosis:  lung cancer  Has patient been notified of appointment date and time?: yes    RECORDS INFORMATION:  Were the records received with the referral (via [employer])? no    Has patient been seen for any external appt for this diagnosis? no    If yes, where? n/a    Has patient had any imaging or procedures outside of [place] for this condition? no      If Yes, where? n/a    ADDITIONAL INFORMATION:  none

## 2021-03-18 NOTE — TELEPHONE ENCOUNTER
RECORDS STATUS - ALL OTHER DIAGNOSIS      RECORDS RECEIVED FROM: [facility]   DATE RECEIVED: 3/18   NOTES STATUS DETAILS   OFFICE NOTE from referring provider Epic Dominick Cote MD in  PULM   OFFICE NOTE from medical oncologist     DISCHARGE SUMMARY from hospital [facility] 3/17/21, 2/25/21   DISCHARGE REPORT from the ER [facility] - [facility] 3/14/21   [facility]  - [facility] 8/10/20   OPERATIVE REPORT Epic 2/25/21: Bronchoscopy   MEDICATION LIST [facility]   CLINICAL TRIAL TREATMENTS TO DATE     LABS     PATHOLOGY REPORTS [facility] 3/17/21: Surg Path/In Process  2/25/21: FNA   ANYTHING RELATED TO DIAGNOSIS Epic 3/17/21   GENONOMIC TESTING     TYPE:     IMAGING (NEED IMAGES & REPORT)     XR PACS 3/17/21: Epic   CT SCANS PACS Epic:  3/17/21    [facility]:  3/14/21, 1/21/21   MRI PACS 1/20/21, 1/16/21: [facility]   MAMMO     ULTRASOUND     PET PACS 2/4/21: [facility]

## 2021-03-19 DIAGNOSIS — C34.10 MALIGNANT NEOPLASM OF UPPER LOBE OF LUNG, UNSPECIFIED LATERALITY (H): Primary | ICD-10-CM

## 2021-03-19 NOTE — PROGRESS NOTES
I called Anya today to notify her CT-guided biopsy results revealing adenocarcinoma.  She is already scheduled in oncology clinic next week which she is aware of.  I also ordered head MRI to be done close to her home at [facility] (fax number [phone]).  Shabbir Cote MD

## 2021-03-20 ENCOUNTER — HEALTH MAINTENANCE LETTER (OUTPATIENT)
Age: 61
End: 2021-03-20

## 2021-03-22 ENCOUNTER — VIRTUAL VISIT (OUTPATIENT)
Dept: ONCOLOGY | Facility: CLINIC | Age: 61
End: 2021-03-22
Attending: INTERNAL MEDICINE
Payer: COMMERCIAL

## 2021-03-22 ENCOUNTER — TELEPHONE (OUTPATIENT)
Dept: ONCOLOGY | Facility: CLINIC | Age: 61
End: 2021-03-22

## 2021-03-22 DIAGNOSIS — R91.8 LUNG MASS: ICD-10-CM

## 2021-03-22 DIAGNOSIS — C34.11 MALIGNANT NEOPLASM OF UPPER LOBE OF RIGHT LUNG (H): Primary | ICD-10-CM

## 2021-03-22 PROBLEM — E11.9 DIABETES MELLITUS, TYPE 2 (H): Status: ACTIVE | Noted: 2021-03-22

## 2021-03-22 PROCEDURE — 99205 OFFICE O/P NEW HI 60 MIN: CPT | Mod: 95 | Performed by: INTERNAL MEDICINE

## 2021-03-22 PROCEDURE — 999N001193 HC VIDEO/TELEPHONE VISIT; NO CHARGE

## 2021-03-22 NOTE — PROGRESS NOTES
"Anya is a 60 year old who is being evaluated via a billable video visit.      How would you like to obtain your AVS? MyChart  If the video visit is dropped, the invitation should be resent by: Send to e-mail at: [email or web address]  Will anyone else be joining your video visit? No     Vitals - Patient Reported  Weight (Patient Reported): 64.9 kg (143 lb)  Height (Patient Reported): 165.1 cm (5' 5\")  BMI (Based on Pt Reported Ht/Wt): 23.8  Pain Score: Extreme Pain (8)(SHOULDER BLADE)    Charo BILLYA        Video Start Time: 2:10  Video-Visit Details    Type of service:  Video Visit    Video End Time:3:00 PM    Originating Location (pt. Location): Home    Distant Location (provider location):  [facility]     Platform used for Video Visit: Mily       CC:  Adenocarcinoma of the lung    History of Present Illness:  Patient presented with right shoulder blade pain for approximately 12 months. She was referred to physical therapy but did not have improvement of her pain. No history of injury. Range of motion of her shoulder is within normal limits without pain. MRI of her scapula was completed which was normal. Thoracic MRI completed which shows.: Approximately 5.5 cm right apical lung mass with localized soft tissue swelling and osseous invasion/erosion consistent with primary lung malignancy. Edema of right side of T2 vertebrae, which may be reactive without obvious infiltration. Spinal cord appears normal.)  CT chest abdomen pelvis was done which showed right apical lung mass approximately 6 cm with local bone and soft tissue invasion or reactive changes. Consistent with primary lung neoplasm. No evidence of metastatic disease. 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    Other screening history: " Negative Cologuard 8/10/2020. Mammogram - 6/9/2020. Patient reports negative Pap approximately 6 years ago.    She has no known pulmonary problems however lifelong smoker, 1 pack/day for 40+ years, no history of exposure. She has no respiratory symptoms except for occasional cough with no phlegm production. Her mother had COPD and she was a smoker as well.  She has diabetes which is insulin-dependent.    On March 17, 2021 she underwent flexible bronchoscopy by Dr. Cote.  Bx revealed adenocarcinoma with origin unclear but clinical picture consistent with NSCLC.  Dr. Cote has ordered a PET and MRI of the brain which are both still pending.     I spoke for over 30 minutes with patient regarding plans for further staging and therapy.  We will need the PET scan and MRI of brain for further staging.  I have also sent her tumor off for genomic testing at our lab here at .  Her tumor bx was PD-L1 positive at over 70%.  She would like to have the MRI done at her local hospital and the PET at [place].    Currently she is having aggravating right scapular pain due to the tumor.  Occasionally it radiates to the front of her chest.  Some help with local measures such as ice.  She has taken Norco, MS, and NSAIDS per her local MD.  I offered her a consult with our Palliative Service team and she would like that.    We discussed how options are quite expansive at this time.  She has either a Stage 2,3,or 4 NSCLC.  I will discuss further at Thoracic Tumor Board tomorrow.  I don't see that she has had PFT's.  She does not use supplemental oxygen or any COPD medications or inhalers.  She has a 40 plus pack year history of smoking and apparently has still been smoking.  She works as a  at the local hospital.  She grew up both in [place].    Finally, we also discussed that if she needs systemic intravenous therapy, such as Keytruda, or radiation, these both could be done at the cancer center  in [place], [place], which is about an hour from her house.    ROS:    10 point ROS is contributory only for the right scapular pain noted above.  No cough or hemoptysis at this time.    LAB:    CBC RESULTS:   Recent Labs   Lab Test 03/17/21  0905   WBC 11.2*   RBC 4.01   HGB 9.9*   HCT 31.6*   MCV 79   MCH 24.7*   MCHC 31.3*   RDW 14.3   *     ALLERGIES          Allergies   Allergen Reactions     Erythromycin Unknown         Past medical: Insulin dependent DM  There is no immunization history on file for this patient.    3 term pregnancies     Past surgical: broken bone, appendectomy           Social History            Socioeconomic History     Marital status:        Spouse name: Not on file     Number of children: Not on file     Years of education: Not on file     Highest education level: Not on file   Occupational History     Not on file   Social Needs     Financial resource strain: Not on file     Food insecurity       Worry: Not on file       Inability: Not on file     Transportation needs       Medical: Not on file       Non-medical: Not on file   Tobacco Use     Smoking status: Not on file   Substance and Sexual Activity     Alcohol use: Not on file     Drug use: Not on file     Sexual activity: Not on file   Lifestyle     Physical activity       Days per week: Not on file       Minutes per session: Not on file     Stress: Not on file   Relationships     Social connections       Talks on phone: Not on file       Gets together: Not on file       Attends Gnosticist service: Not on file       Active member of club or organization: Not on file       Attends meetings of clubs or organizations: Not on file       Relationship status: Not on file     Intimate partner violence       Fear of current or ex partner: Not on file       Emotionally abused: Not on file       Physically abused: Not on file       Forced sexual activity: Not on file   Other Topics Concern     Not on file   Social History Narrative      Not on file            Family history: mother w copd      MED:          Current Outpatient Medications   Medication Sig     acetaminophen (TYLENOL) 500 MG tablet Take 500 mg by mouth     atorvastatin (LIPITOR) 40 MG tablet Take 40 mg by mouth     blood glucose (TRUETEST) test strip Test twice daily.     HYDROcodone-acetaminophen (NORCO) 5-325 MG tablet Take 1 tablet by mouth     ibuprofen (ADVIL/MOTRIN) 200 MG tablet Take 400 mg by mouth     insulin glargine (LANTUS SOLOSTAR) 100 UNIT/ML pen Inject 44 Units Subcutaneous     insulin pen needle (MM PEN NEEDLES) 31G X 8 MM miscellaneous Use twice daily.     metFORMIN (GLUCOPHAGE-XR) 500 MG 24 hr tablet Take 1,000 mg by mouth     Semaglutide,0.25 or 0.5MG/DOS, 2 MG/1.5ML SOPN Inject 0.5 mg Subcutaneous      PE:    Vitals: There were no vitals taken for this visit.  BMI= There is no height or weight on file to calculate BMI.     During this video visit the patient appeared well.  She was alert and oriented times 3.    A/P:    1) Adenocarcinoma of the lung:  The patient has either stage 2,3, or 4 NSCLC.  Dr. Cote has ordered a PET and MRI of the brain.  I have ordered genomic testing of the primary tumor.  It is 70% PD-L1 positive.  If she has stage 2 dz, surgery is the primary option and would need to be done here.  She will need PFT's for that evaluation.  The CT scan does appear to have local invasion of the third rib which would make surgery more challenging.  If she has stage 3 disease (which likely will need an EBUS and PET for evaluation), she should receive chemo/XRT followed by durvalumab, which could be done at the [facility].  If she has stage 4 dz, genomic testing may reveal a target, or otherwise she would be a good candidate for Keytruda monotherapy (as per Keynote 24) which could also be done at [place].    I would like to see the patient back after PET, MRI and DNA studies are complete.  I will present her case at Thoracic Tumor Board.   I will also want a CMP lab test, which she could have done at her local hospital.    2) Right scapular pain:  This pain is almost certainly due to the tumor.  It has been poorly controlled with oral narcotics and is clearly limiting her quality of life.  The patient agrees a referral to Palliative Services for assistance in pain management sounds like a good idea.  I have placed this order.  A video visit would be adequate for this evaluation.

## 2021-03-22 NOTE — LETTER
"    3/22/2021         RE: Anya Mazariegos  [address]  [place] [address]        Dear Colleague,    Thank you for referring your patient, Anya Mazariegos, to the [facility]. Please see a copy of my visit note below.    Anya is a 60 year old who is being evaluated via a billable video visit.      How would you like to obtain your AVS? MyChart  If the video visit is dropped, the invitation should be resent by: Send to e-mail at: [email or web address]  Will anyone else be joining your video visit? No     Vitals - Patient Reported  Weight (Patient Reported): 64.9 kg (143 lb)  Height (Patient Reported): 165.1 cm (5' 5\")  BMI (Based on Pt Reported Ht/Wt): 23.8  Pain Score: Extreme Pain (8)(SHOULDER BLADE)    Charo CARBONE        Video Start Time: 2:10  Video-Visit Details    Type of service:  Video Visit    Video End Time:3:00 PM    Originating Location (pt. Location): Home    Distant Location (provider location):  [facility]     Platform used for Video Visit: Mily       CC:  Adenocarcinoma of the lung    History of Present Illness:  Patient presented with right shoulder blade pain for approximately 12 months. She was referred to physical therapy but did not have improvement of her pain. No history of injury. Range of motion of her shoulder is within normal limits without pain. MRI of her scapula was completed which was normal. Thoracic MRI completed which shows.: Approximately 5.5 cm right apical lung mass with localized soft tissue swelling and osseous invasion/erosion consistent with primary lung malignancy. Edema of right side of T2 vertebrae, which may be reactive without obvious infiltration. Spinal cord appears normal.)  CT chest abdomen pelvis was done which showed right apical lung mass approximately 6 cm with local bone and soft tissue invasion or reactive changes. Consistent with primary lung neoplasm. No evidence of metastatic disease. 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    Other screening history: Negative Cologuard 8/10/2020. Mammogram - 6/9/2020. Patient reports negative Pap approximately 6 years ago.    She has no known pulmonary problems however lifelong smoker, 1 pack/day for 40+ years, no history of exposure. She has no respiratory symptoms except for occasional cough with no phlegm production. Her mother had COPD and she was a smoker as well.  She has diabetes which is insulin-dependent.    On March 17, 2021 she underwent flexible bronchoscopy by Dr. Cote.  Bx revealed adenocarcinoma with origin unclear but clinical picture consistent with NSCLC.  Dr. Cote has ordered a PET and MRI of the brain which are both still pending.     I spoke for over 30 minutes with patient regarding plans for further staging and therapy.  We will need the PET scan and MRI of brain for further staging.  I have also sent her tumor off for genomic testing at our lab here at .  Her tumor bx was PD-L1 positive at over 70%.  She would like to have the MRI done at her local hospital and the PET at [place].    Currently she is having aggravating right scapular pain due to the tumor.  Occasionally it radiates to the front of her chest.  Some help with local measures such as ice.  She has taken Norco, MS, and NSAIDS per her local MD.  I offered her a consult with our Palliative Service team and she would like that.    We discussed how options are quite expansive at this time.  She has either a Stage 2,3,or 4 NSCLC.  I will discuss further at Thoracic Tumor Board tomorrow.  I don't see that she has had PFT's.  She does not use supplemental oxygen or any COPD medications or inhalers.  She has a 40 plus pack year history of smoking and apparently has still been smoking.  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  She grew up both in [place].    Finally, we also discussed that if she needs systemic intravenous therapy, such as Keytruda, or radiation, these both could be done at the cancer center in [place], [place], which is about an hour from her house.    ROS:    10 point ROS is contributory only for the right scapular pain noted above.  No cough or hemoptysis at this time.    LAB:    CBC RESULTS:   Recent Labs   Lab Test 03/17/21  0905   WBC 11.2*   RBC 4.01   HGB 9.9*   HCT 31.6*   MCV 79   MCH 24.7*   MCHC 31.3*   RDW 14.3   *     ALLERGIES          Allergies   Allergen Reactions     Erythromycin Unknown         Past medical: Insulin dependent DM  There is no immunization history on file for this patient.    3 term pregnancies     Past surgical: broken bone, appendectomy           Social History            Socioeconomic History     Marital status:        Spouse name: Not on file     Number of children: Not on file     Years of education: Not on file     Highest education level: Not on file   Occupational History     Not on file   Social Needs     Financial resource strain: Not on file     Food insecurity       Worry: Not on file       Inability: Not on file     Transportation needs       Medical: Not on file       Non-medical: Not on file   Tobacco Use     Smoking status: Not on file   Substance and Sexual Activity     Alcohol use: Not on file     Drug use: Not on file     Sexual activity: Not on file   Lifestyle     Physical activity       Days per week: Not on file       Minutes per session: Not on file     Stress: Not on file   Relationships     Social connections       Talks on phone: Not on file       Gets together: Not on file       Attends Holiness service: Not on file       Active member of club or organization: Not on file       Attends meetings of clubs or organizations: Not on file       Relationship status: Not on file     Intimate  partner violence       Fear of current or ex partner: Not on file       Emotionally abused: Not on file       Physically abused: Not on file       Forced sexual activity: Not on file   Other Topics Concern     Not on file   Social History Narrative     Not on file            Family history: mother w copd      MED:          Current Outpatient Medications   Medication Sig     acetaminophen (TYLENOL) 500 MG tablet Take 500 mg by mouth     atorvastatin (LIPITOR) 40 MG tablet Take 40 mg by mouth     blood glucose (TRUETEST) test strip Test twice daily.     HYDROcodone-acetaminophen (NORCO) 5-325 MG tablet Take 1 tablet by mouth     ibuprofen (ADVIL/MOTRIN) 200 MG tablet Take 400 mg by mouth     insulin glargine (LANTUS SOLOSTAR) 100 UNIT/ML pen Inject 44 Units Subcutaneous     insulin pen needle (MM PEN NEEDLES) 31G X 8 MM miscellaneous Use twice daily.     metFORMIN (GLUCOPHAGE-XR) 500 MG 24 hr tablet Take 1,000 mg by mouth     Semaglutide,0.25 or 0.5MG/DOS, 2 MG/1.5ML SOPN Inject 0.5 mg Subcutaneous      PE:    Vitals: There were no vitals taken for this visit.  BMI= There is no height or weight on file to calculate BMI.     During this video visit the patient appeared well.  She was alert and oriented times 3.    A/P:    1) Adenocarcinoma of the lung:  The patient has either stage 2,3, or 4 NSCLC.  Dr. Cote has ordered a PET and MRI of the brain.  I have ordered genomic testing of the primary tumor.  It is 70% PD-L1 positive.  If she has stage 2 dz, surgery is the primary option and would need to be done here.  She will need PFT's for that evaluation.  The CT scan does appear to have local invasion of the third rib which would make surgery more challenging.  If she has stage 3 disease (which likely will need an EBUS and PET for evaluation), she should receive chemo/XRT followed by durvalumab, which could be done at the [facility].  If she has stage 4 dz, genomic testing may reveal a target, or otherwise  she would be a good candidate for Keytruda monotherapy (as per Keynote 24) which could also be done at [place].    I would like to see the patient back after PET, MRI and DNA studies are complete.  I will present her case at Thoracic Tumor Board.  I will also want a CMP lab test, which she could have done at her local hospital.    2) Right scapular pain:  This pain is almost certainly due to the tumor.  It has been poorly controlled with oral narcotics and is clearly limiting her quality of life.  The patient agrees a referral to Palliative Services for assistance in pain management sounds like a good idea.  I have placed this order.  A video visit would be adequate for this evaluation.      Again, thank you for allowing me to participate in the care of your patient.        Sincerely,        Simon Ramirez MD

## 2021-03-22 NOTE — TELEPHONE ENCOUNTER
Order for MRI faxed, per request from NATALIA Low CMA ([facility])      ----- Message -----  From: Dominick Cote MD  Hi,    This patient has an appointment w Chris Ramirez next week.  She lives away from the [facility] and needs to have a head MRI.  She is not close to any [place] location and requesting her head MRI order to be faxed to [phone] to a place called [facility].  Thanks,    Shabbir Cote

## 2021-03-23 ENCOUNTER — PREP FOR PROCEDURE (OUTPATIENT)
Dept: SURGERY | Facility: CLINIC | Age: 61
End: 2021-03-23

## 2021-03-23 DIAGNOSIS — C34.11 MALIGNANT NEOPLASM OF UPPER LOBE OF RIGHT LUNG (H): Primary | ICD-10-CM

## 2021-03-23 DIAGNOSIS — R22.2 CHEST WALL MASS: Primary | ICD-10-CM

## 2021-03-23 PROCEDURE — 81445 SO NEO GSAP 5-50DNA/DNA&RNA: CPT | Mod: GT | Performed by: INTERNAL MEDICINE

## 2021-03-23 PROCEDURE — G0452 MOLECULAR PATHOLOGY INTERPR: HCPCS | Mod: 26 | Performed by: PATHOLOGY

## 2021-03-23 PROCEDURE — 81445 SO NEO GSAP 5-50DNA/DNA&RNA: CPT | Performed by: INTERNAL MEDICINE

## 2021-03-23 RX ORDER — CEFAZOLIN SODIUM 2 G/50ML
2 SOLUTION INTRAVENOUS SEE ADMIN INSTRUCTIONS
Status: CANCELLED | OUTPATIENT
Start: 2021-03-23

## 2021-03-23 RX ORDER — CEFAZOLIN SODIUM 2 G/50ML
2 SOLUTION INTRAVENOUS
Status: CANCELLED | OUTPATIENT
Start: 2021-03-23

## 2021-03-23 NOTE — PROGRESS NOTES
I spoke to Anya about the discussion/recommendations from our Thoracic Tumor Board conference today.   I explained a TEMLA procedure and need to see a surgeon in person, with PFT's and PAC appt.    We discussed further and decided it would be easiest for her to come on a Thurs to get tests and see surgeon, then have procedure the next day.        She is scheduled locally for a brain MRI on Sat., 3/27 and is waiting for them to call with PET scheduling (pending insurance authorization).   I gave her my direct # to let me know when PET will get done.    Messages sent to OR and clinic schedulers, orders placed.

## 2021-03-24 ENCOUNTER — PATIENT OUTREACH (OUTPATIENT)
Dept: ONCOLOGY | Facility: CLINIC | Age: 61
End: 2021-03-24

## 2021-03-24 LAB — COPATH REPORT: NORMAL

## 2021-03-24 NOTE — PROGRESS NOTES
Writer placed outgoing fax to [facility] for imaging orders per the request of RENAN Pagan. Screen shot of fax confirmation below.

## 2021-03-25 ENCOUNTER — TELEPHONE (OUTPATIENT)
Dept: SURGERY | Facility: CLINIC | Age: 61
End: 2021-03-25

## 2021-03-25 DIAGNOSIS — Z11.59 ENCOUNTER FOR SCREENING FOR OTHER VIRAL DISEASES: Primary | ICD-10-CM

## 2021-03-25 DIAGNOSIS — Z11.59 ENCOUNTER FOR SCREENING FOR OTHER VIRAL DISEASES: ICD-10-CM

## 2021-03-25 PROBLEM — R22.2 CHEST WALL MASS: Status: ACTIVE | Noted: 2021-03-25

## 2021-03-25 NOTE — TELEPHONE ENCOUNTER
Spoke with patient to schedule procedure with 04/05   Procedure was scheduled on 04/05 at [facility] OR  Patient will have H&P with PAC     Patient is aware a COVID-19 test is needed before their procedure. The test should be with-in 4 days of their procedure.   Test Details: Date 04/01 Location [facility]    Patient is aware a / is needed day of surgery.   Surgery letter was sent via [employer], patient has my direct contact information for any further questions.

## 2021-03-26 NOTE — TELEPHONE ENCOUNTER
FUTURE VISIT INFORMATION      SURGERY INFORMATION:    Date: 4.5.21    Location: [facility] OR    Surgeon:  Dr. Alexis Smith    Anesthesia Type:  general    Procedure: LYMPHADENECTOMY, MEDIASTINUM, EXTENDED, TRANSCERVICAL APPROACH      RECORDS REQUESTED FROM:         Most recent EKG+ Tracing: 3.23.21

## 2021-03-27 ENCOUNTER — TRANSFERRED RECORDS (OUTPATIENT)
Dept: HEALTH INFORMATION MANAGEMENT | Facility: CLINIC | Age: 61
End: 2021-03-27

## 2021-03-29 DIAGNOSIS — C34.11 MALIGNANT NEOPLASM OF UPPER LOBE OF RIGHT LUNG (H): Primary | ICD-10-CM

## 2021-03-29 RX ORDER — OMEPRAZOLE 40 MG/1
40 CAPSULE, DELAYED RELEASE ORAL
COMMUNITY

## 2021-03-29 RX ORDER — MORPHINE SULFATE 60 MG/1
60 TABLET, FILM COATED, EXTENDED RELEASE ORAL EVERY 8 HOURS
COMMUNITY

## 2021-03-29 RX ORDER — SENNOSIDES 8.6 MG
1 TABLET ORAL 2 TIMES DAILY
COMMUNITY

## 2021-03-29 NOTE — PROGRESS NOTES
Preoperative Assessment Center Medication History Note    Medication history completed on March 29, 2021 by this writer. See Epic admission navigator for prior to admission medications. Operating room staff will still need to confirm medications and last dose information on day of surgery.     Medication history interview sources:  patient, payor information, care everywhere.     Changes made to PTA medication list (reason)  Added: senna,   Deleted: tylenol,   Changed: MS CONTIN dosing/sig, omeprazole,     Additional medication history information (including reliability of information, actions taken by pharmacist):    -- No recent (within 30 days) course of antibiotics  -- No recent (within 30 days) course of systemic steroids  -- Patient declines being on any other prescription or over-the-counter medications    Pain Medication Quantification - Patient is currently scheduled for a same day surgery. If this plan changes and patient is admitted, the inpatient pain management service could be consulted for specific pain management recommendations (available at pager [phone] from 8 AM - 3 PM Mon - Fri and available via phone answering service 24/7 at [phone]).     - OUTPATIENT MEDICATIONS (related to pain management):  -- Long-acting opioid: MS CONTIN 60 mg PO q8hr scheduled   -- Short-acting opioid: oxycodone 5 mg capsule - take 1-2 capsule q4h PRN (Averages 2-4 capsules/day)    Average daily oral morphine equivalent (OME): 135-160 mg of OME/day      Prior to Admission medications    Medication Sig Last Dose Taking? Auth Provider   atorvastatin (LIPITOR) 40 MG tablet Take 40 mg by mouth daily  Taking Yes Reported, Patient   ibuprofen (ADVIL/MOTRIN) 200 MG tablet Take 400 mg by mouth every 6 hours as needed  Taking Yes Reported, Patient   insulin glargine (LANTUS SOLOSTAR) 100 UNIT/ML pen Inject 44 Units Subcutaneous every morning  Taking Yes Reported, Patient   metFORMIN (GLUCOPHAGE-XR) 500 MG 24 hr tablet  Take 1,000 mg by mouth 2 times daily (with meals)  Taking Yes Reported, Patient   morphine (MS CONTIN) 60 MG 12 hr tablet Take 60 mg by mouth every 8 hours Taking Yes Unknown, Entered By History   omeprazole (PRILOSEC) 40 MG DR capsule Take 40 mg by mouth 2 times daily (before meals) Taking Yes Unknown, Entered By History   oxyCODONE (OXY-IR) 5 MG capsule Take 1-2 capsules by mouth every 4 hours as needed for severe pain  Taking Yes Reported, Patient   sennosides (SENOKOT) 8.6 MG tablet Take 1 tablet by mouth 2 times daily Taking Yes Unknown, Entered By History   blood glucose (TRUETEST) test strip Test twice daily.   Reported, Patient   insulin pen needle (MM PEN NEEDLES) 31G X 8 MM miscellaneous Use twice daily.   Reported, Patient          Medication history completed by: [facility]ren [facility]

## 2021-03-30 ENCOUNTER — VIRTUAL VISIT (OUTPATIENT)
Dept: SURGERY | Facility: CLINIC | Age: 61
End: 2021-03-30
Attending: CLINICAL NURSE SPECIALIST
Payer: COMMERCIAL

## 2021-03-30 ENCOUNTER — ANESTHESIA EVENT (OUTPATIENT)
Dept: SURGERY | Facility: CLINIC | Age: 61
End: 2021-03-30
Payer: COMMERCIAL

## 2021-03-30 ENCOUNTER — PRE VISIT (OUTPATIENT)
Dept: SURGERY | Facility: CLINIC | Age: 61
End: 2021-03-30

## 2021-03-30 DIAGNOSIS — Z01.818 PREOP EXAMINATION: Primary | ICD-10-CM

## 2021-03-30 DIAGNOSIS — C34.91 PRIMARY ADENOCARCINOMA OF RIGHT LUNG (H): ICD-10-CM

## 2021-03-30 DIAGNOSIS — R22.2 CHEST WALL MASS: ICD-10-CM

## 2021-03-30 PROCEDURE — 99204 OFFICE O/P NEW MOD 45 MIN: CPT | Mod: 95 | Performed by: NURSE PRACTITIONER

## 2021-03-30 SDOH — ECONOMIC STABILITY: FOOD INSECURITY: WITHIN THE PAST 12 MONTHS, THE FOOD YOU BOUGHT JUST DIDN'T LAST AND YOU DIDN'T HAVE MONEY TO GET MORE.: NOT ASKED

## 2021-03-30 SDOH — ECONOMIC STABILITY: FOOD INSECURITY: WITHIN THE PAST 12 MONTHS, YOU WORRIED THAT YOUR FOOD WOULD RUN OUT BEFORE YOU GOT MONEY TO BUY MORE.: NOT ASKED

## 2021-03-30 SDOH — ECONOMIC STABILITY: TRANSPORTATION INSECURITY
IN THE PAST 12 MONTHS, HAS THE LACK OF TRANSPORTATION KEPT YOU FROM MEDICAL APPOINTMENTS OR FROM GETTING MEDICATIONS?: NOT ASKED

## 2021-03-30 SDOH — ECONOMIC STABILITY: TRANSPORTATION INSECURITY
IN THE PAST 12 MONTHS, HAS LACK OF TRANSPORTATION KEPT YOU FROM MEETINGS, WORK, OR FROM GETTING THINGS NEEDED FOR DAILY LIVING?: NOT ASKED

## 2021-03-30 SDOH — ECONOMIC STABILITY: INCOME INSECURITY: HOW HARD IS IT FOR YOU TO PAY FOR THE VERY BASICS LIKE FOOD, HOUSING, MEDICAL CARE, AND HEATING?: NOT ASKED

## 2021-03-30 ASSESSMENT — LIFESTYLE VARIABLES: TOBACCO_USE: 1

## 2021-03-30 ASSESSMENT — ENCOUNTER SYMPTOMS: ORTHOPNEA: 0

## 2021-03-30 ASSESSMENT — PAIN SCALES - GENERAL: PAINLEVEL: EXTREME PAIN (8)

## 2021-03-30 NOTE — PATIENT INSTRUCTIONS
Preparing for Your Surgery      Name:  Anya Mazariegos   MRN:  [id number]   :  1960   Today's Date:  3/30/2021       Arriving for surgery:  Surgery date:  2021  Arrival time:  10:00 am    Restrictions due to COVID 19:  One consistent visitor per patient is allowed.  The visitor will be allowed in the pre-op area.  Visitors are asked to leave the building during the surgery.  No ill visitors.  All visitors must wear face mask.    [employer] parking is available for anyone with mobility limitations or disabilities.  ([facility]  24 hours/ 7 days a week; [facility]  7 am- 3:30 pm, Mon- Fri)    Please come to:     [facility] [address]  [address] [place]  [address]       -    Please proceed to [address] on the 3rd floor. [phone]?     - ?If you are in need of directions, wheelchair or escort please stop at the Information Desk in the lobby.  ?     What can I eat or drink?  -  You may eat and drink normally for up to 8 hours before your surgery. (Until 4 am)  -  You may have clear liquids until 2 hours before surgery. (Until 10 m arrival time)    Examples of clear liquids:  Water  Clear broth  Juices (apple, white grape, white cranberry  and cider) without pulp  Noncarbonated, powder based beverages  (lemonade and Juan David-Aid)  Sodas (Sprite, 7-Up, ginger ale and seltzer)  Coffee or tea (without milk or cream)  Gatorade    -  No Alcohol for at least 24 hours before surgery     Which medicines can I take?    Hold Aspirin for 7 days before surgery.   Hold Multivitamins for 7 days before surgery.  Hold Supplements for 7 days before surgery.  Hold Ibuprofen (Advil, Motrin) for 1 day before surgery--unless otherwise directed by surgeon.  Hold Naproxen (Aleve) for 4 days before surgery.    -  DO NOT take these medications the day of surgery:  Metformin       -  PLEASE TAKE these medications the day of surgery:  80% of usual am insulin dose (35  units)  Atorvastatin (if usually taken in the am)  MS contin per your routine  Omeprazole  Oxycodone if needed  Sennosides       How do I prepare myself?  - Please take 2 showers before surgery using Scrubcare or Hibiclens soap.    Use this soap only from the neck to your toes.     Leave the soap on your skin for one minute--then rinse thoroughly.      You may use your own shampoo and conditioner; no other hair products.   - Please remove all jewelry and body piercings.  - No lotions, deodorants or fragrance.  - No makeup or fingernail polish.   - Bring your ID and insurance card.    - All patients are required to have a Covid-19 test within 4 days of surgery/procedure.      -Patients will be contacted by the [facility] scheduling team within 1 week of surgery to make an appointment.      - Patients may call the Scheduling team at [phone] if they have not been scheduled within 4 days of  surgery.      ALL PATIENTS GOING HOME THE SAME DAY OF SURGERY ARE REQUIRED TO HAVE A RESPONSIBLE ADULT TO DRIVE AND BE IN ATTENDANCE WITH THEM FOR 24 HOURS FOLLOWING SURGERY.    IF THE RESPONSIBLE ADULT IS REQUIRED FOR POST OP TEACHING THE POST OP RN WILL ASK THEM TO COME BACK TO THE RECOVERY AREA.    Questions or Concerns:    - For any questions regarding the day of surgery or your hospital stay, please contact the Pre Admission Nursing Office at [phone].       - If you have health changes between today and your surgery please call your surgeon.       For questions after surgery please call your surgeons office.

## 2021-03-30 NOTE — ANESTHESIA PREPROCEDURE EVALUATION
Anesthesia Pre-Procedure Evaluation    Patient: Anya Mazariegos   MRN: [id number] : 1960        Preoperative Diagnosis: Chest wall mass [R22.2]   Procedure : Procedure(s):  LYMPHADENECTOMY, MEDIASTINUM, EXTENDED, TRANSCERVICAL APPROACH     Past Medical History:   Diagnosis Date     COPD (chronic obstructive pulmonary disease) (H)      Diabetes (H)      GERD (gastroesophageal reflux disease)      High cholesterol       Past Surgical History:   Procedure Laterality Date     APPENDECTOMY       ENDOBRONCHIAL ULTRASOUND FLEXIBLE N/A 2021    Procedure: BRONCHOSCOPY, DIAGNOSTIC, endobronchial ultrasound, transbronchial biopsies;  Surgeon: Beto Garibay MD;  Location: [facility] OR     ORTHOPEDIC SURGERY Right     ankle ORIF      Allergies   Allergen Reactions     Erythromycin Nausea      Social History     Tobacco Use     Smoking status: Current Some Day Smoker     Packs/day: 1.00     Years: 40.00     Pack years: 40.00     Types: Cigarettes     Smokeless tobacco: Never Used   Substance Use Topics     Alcohol use: Yes     Comment: rare      Wt Readings from Last 1 Encounters:   21 63 kg (138 lb 14.2 oz)        Anesthesia Evaluation   Pt has had prior anesthetic. Type: MAC, General and Regional.    No history of anesthetic complications       ROS/MED HX  ENT/Pulmonary:     (+) tobacco use, Current use, 1 packs/day, COPD,  (-) asthma and SUKH risk factors   Neurologic:  - neg neurologic ROS  (-) no CVA and no TIA   Cardiovascular:     (+) Dyslipidemia ----- (-) LUND, orthopnea/PND, murmur and wheezes   METS/Exercise Tolerance: 3 - Able to walk 1-2 blocks without stopping    Hematologic:  - neg hematologic  ROS  (-) history of blood clots and history of blood transfusion   Musculoskeletal: Comment: Low back pain      GI/Hepatic:     (+) GERD, Asymptomatic on medication,     Renal/Genitourinary:  - neg Renal ROS     Endo:     (+) type II DM, Last HgA1c: 8.6, date: 21, Using insulin, - not using insulin pump.  Normal glucose range: 136- 240, not previously admitted for DM/DKA.     Psychiatric/Substance Use:  - neg psychiatric ROS     Infectious Disease: Comment: Completed covid vaccines in January 2021    COVID NEGATIVE 4/1/21 - neg infectious disease ROS     Malignancy:   (+) Malignancy, History of Lung.  Lung CA Active status post.        Other:  - neg other ROS          Physical Exam    Airway        Mallampati: II   TM distance: > 3 FB   Neck ROM: full   Mouth opening: > 3 cm    Respiratory Devices and Support         Dental  no notable dental history         Cardiovascular          Rhythm and rate: regular and normal (-) no systolic click and no murmur    Pulmonary   pulmonary exam normal        breath sounds clear to auscultation   (+) decreased breath sounds   (-) no wheezes        OUTSIDE LABS:  CBC:   Lab Results   Component Value Date    WBC 11.2 (H) 03/17/2021    HGB 9.9 (L) 03/17/2021    HCT 31.6 (L) 03/17/2021     (H) 03/17/2021     BMP:   Lab Results   Component Value Date    POTASSIUM 4.5 02/23/2021    CR 0.57 (A) 02/23/2021     (A) 02/23/2021     COAGS:   Lab Results   Component Value Date    INR 1.10 03/17/2021     POC:   Lab Results   Component Value Date     (H) 02/25/2021     HEPATIC: No results found for: ALBUMIN, PROTTOTAL, ALT, AST, GGT, ALKPHOS, BILITOTAL, BILIDIRECT, ELICIA  OTHER:   Lab Results   Component Value Date    A1C 8.6 (A) 02/23/2021       Anesthesia Plan    ASA Status:  3   NPO Status:  NPO Appropriate    Anesthesia Type: General.     - Airway: ETT   Induction: Intravenous.   Maintenance: Inhalation.   Techniques and Equipment:     - Lines/Monitors: 2nd IV, Arterial Line     Consents    Anesthesia Plan(s) and associated risks, benefits, and realistic alternatives discussed. Questions answered and patient/representative(s) expressed understanding.     - Discussed with:  Patient      - Extended Intubation/Ventilatory Support Discussed: No.      - Patient is DNR/DNI  Status: No         Postoperative Care    Pain management: Multi-modal analgesia.   PONV prophylaxis: Ondansetron (or other 5HT-3), Dexamethasone or Solumedrol     Comments:              PAC Discussion and Assessment    ASA Classification: 3  Case is suitable for: [place]  Anesthetic techniques and relevant risks discussed: GA                  PAC Resident/NP Anesthesia Assessment: Anya Mazariegos 60 year old female scheduled for LYMPHADENECTOMY, MEDIASTINUM, EXTENDED, TRANSCERVICAL APPROACH on 4/5/21 by Dr. Shields in evaluation of a chest wall mass in the setting of lung cancer.  PAC referral for risk assessment and optimization for anesthesia with comorbid conditions of: hyperlipidemia, tobacco use and diabetes.    Pre-operative considerations:  1.  Cardiac:  Functional status- METS 3.  She doesn't exercise purposefully, but reports she is active on her feet at work as a  and can walk a few blocks with no cardiopulmonary complication.  BNP ordered.  She has no noted cardiac conditions other than hyperlipidemia.  Intermediate risk surgery with 0.9% risk of major adverse cardiac event.   2.  Pulm:    SUKH risk: low risk.  She is a current smoker.  She was smoking 1ppd, but has been working on decreasing and is currently down to about 0.5ppd.  She has purchased nicotine inhalers and gum, but hasn't been using them consistently yet.  Continued work on smoking cessation encouraged.   Her chart has a noted diagnosis of COPD on it, but she reports she hasn't been formally diagnosed with COPD and is on no COPD meds.  She has never had a PFT, but is scheduled for one on 4/1/21.  Please see procedure tab for PFT results once available.    3.  GI:  Risk of PONV score = 2.  If > 2, anti-emetic intervention recommended.    GERD is well controlled on omeprazole and since decreasing ibuprofen use.  4. Endo:  Diabetes is managed with lantus and metformin.  Hold metformin DOS and take only 80% of lantus.  5.  Pain:  She has significant right shoulder/scapula pain thought to be related to her lung mass.  She is taking MS contin TID and oxycodone q4 hours typically around the clock for the pain.  Please see pharmD note regarding pain management.   6. Heme:  Platelet count elevated at 503 (stable) and hgb low at 10.4 (stable).      VTE risk: 1.8%    Virtual visit - Please refer to the physical examination documented by the anesthesiologist in the anesthesia record on the day of surgery      Patient is optimized and is acceptable candidate for the proposed procedure.  No further diagnostic evaluation is needed.         Ale Sheets DNP, RN, APRN      Reviewed and Signed by PAC Mid-Level Provider/Resident  Mid-Level Provider/Resident: Ale Sheets DNP, RN, APRN  Date: 3/30/21  Time: 1759                               DARA Beltran CNP

## 2021-03-30 NOTE — PROGRESS NOTES
Anya is a 60 year old who is being evaluated via a billable video visit.      How would you like to obtain your AVS? MyChart    Will anyone else be joining your video visit? No    HPI         Review of Systems         Objective    Vitals - Patient Reported  Pain Score: Extreme Pain (8)        Physical Exam     SETH Bennett LPN

## 2021-03-30 NOTE — H&P
Pre-Operative H & P         Video-Visit Details    Type of service:  Video Visit    Patient verbally consented to video service today: YES      Video Start Time: 1700  Video End Time (time video stopped): 1720    Originating Location (pt. Location): Home    Distant Location (provider location): provider's home    Mode of Communication:  Video Conference via [employer]      CC:  Preoperative exam to assess for increased cardiopulmonary risk while undergoing surgery and anesthesia.    Date of Encounter: 3/30/2021  Primary Care Physician:  No Ref-Primary, Physician  Associated diagnosis: chest wall mass in the setting of lung cancer    HPI  Anya Mazariegos is a 60 year old female who presents for pre-operative H & P in preparation for LYMPHADENECTOMY, MEDIASTINUM, EXTENDED, TRANSCERVICAL APPROACH on 4/5/21 by Dr. Shields at [facility].     Anya Mazariegos 60 year old female with hyperlipidemia, tobacco use and diabetes that has a chest wall mass in the setting of lung cancer.  This past summer she started having right shoulder/scapula pain and assumed it was musculoskeletal in nature and initially just tried to manage it with ibuprofen, but the pain persisted and gradually worsened so she sought medical evaluation. She was referred to physical therapy, but that wasn't helpful.  Subsequently, imaging was ordered for further evaluation.  A scapula MRI was normal, but a thoracic MRI showed an incidental finding of a 5.5cm right lung mass consistent with primary lung cancer.  There was also concern for lymphangitic spread and questionable chest wall invasion.  She was referred here to Dr. Cote for surgical consultation.  He ordered an EBUS for biopsies and that was completed on 3/17/21.  The biopsy results were positive for adenocarcinoma.   She was subsequently referred to oncology and her case was further discussed at the Thoracic Tumor Board.  She has since  consulted with Dr. Ramirez in oncology to establish care and the Thoracic Tumor Board has recommended the above listed procedure for further evaluation.  Initial treatment measures to be determined post-op.     History is obtained from the patient and the medical record.     Past Medical History  Past Medical History:   Diagnosis Date     COPD (chronic obstructive pulmonary disease) (H)      Diabetes (H)      GERD (gastroesophageal reflux disease)      High cholesterol        Past Surgical History  Past Surgical History:   Procedure Laterality Date     APPENDECTOMY       ENDOBRONCHIAL ULTRASOUND FLEXIBLE N/A 02/25/2021    Procedure: BRONCHOSCOPY, DIAGNOSTIC, endobronchial ultrasound, transbronchial biopsies;  Surgeon: Beto Garibay MD;  Location: [facility] OR     ORTHOPEDIC SURGERY Right     ankle ORIF       Hx of Blood transfusions/reactions: none    Hx of abnormal bleeding or anti-platelet use: none    Menstrual history: No LMP recorded. Patient is postmenopausal.:     Steroid use in the last year: short course or prednisone >1 months ago (not sure of exact date).  No long term steroids.      Personal or FH with difficulty with Anesthesia:  none    Prior to Admission Medications  Current Outpatient Medications   Medication Sig Dispense Refill     atorvastatin (LIPITOR) 40 MG tablet Take 40 mg by mouth daily        ibuprofen (ADVIL/MOTRIN) 200 MG tablet Take 400 mg by mouth every 6 hours as needed        insulin glargine (LANTUS SOLOSTAR) 100 UNIT/ML pen Inject 44 Units Subcutaneous every morning        metFORMIN (GLUCOPHAGE-XR) 500 MG 24 hr tablet Take 1,000 mg by mouth 2 times daily (with meals)        morphine (MS CONTIN) 60 MG 12 hr tablet Take 60 mg by mouth every 8 hours       omeprazole (PRILOSEC) 40 MG DR capsule Take 40 mg by mouth 2 times daily (before meals)       oxyCODONE (OXY-IR) 5 MG capsule Take 1-2 capsules by mouth every 4 hours as needed for severe pain        sennosides (SENOKOT) 8.6 MG tablet Take  1 tablet by mouth 2 times daily       blood glucose (TRUETEST) test strip Test twice daily.       insulin pen needle (MM PEN NEEDLES) 31G X 8 MM miscellaneous Use twice daily.         Allergies  Allergies   Allergen Reactions     Erythromycin Nausea       Social History  Social History     Socioeconomic History     Marital status:      Spouse name: Not on file     Number of children: 3     Years of education: Not on file     Highest education level: Not on file   Occupational History     Occupation:    Social Needs     Financial resource strain: Not on file     Food insecurity     Worry: Not on file     Inability: Not on file     Transportation needs     Medical: Not on file     Non-medical: Not on file   Tobacco Use     Smoking status: Current Some Day Smoker     Packs/day: 1.00     Years: 40.00     Pack years: 40.00     Types: Cigarettes     Smokeless tobacco: Never Used   Substance and Sexual Activity     Alcohol use: Yes     Comment: rare     Drug use: Never     Sexual activity: Not on file   Lifestyle     Physical activity     Days per week: Not on file     Minutes per session: Not on file     Stress: Not on file   Relationships     Social connections     Talks on phone: Not on file     Gets together: Not on file     Attends [employer] service: Not on file     Active member of club or organization: Not on file     Attends meetings of clubs or organizations: Not on file     Relationship status: Not on file     Intimate partner violence     Fear of current or ex partner: Not on file     Emotionally abused: Not on file     Physically abused: Not on file     Forced sexual activity: Not on file   Other Topics Concern     Parent/sibling w/ CABG, MI or angioplasty before 65F 55M? Not Asked   Social History Narrative     Not on file       Family History  Family History   Problem Relation Age of Onset     Chronic Obstructive Pulmonary Disease Mother      LUNG DISEASE Father         autoimmune  fibrosis     Myocardial Infarction Father      Leukemia Sister         CLL     Diabetes Brother      Deep Vein Thrombosis No family hx of      Anesthesia Reaction No family hx of            ROS/MED HISTORY OF  The complete review of systems is negative other than noted in the HPI or here.   ENT/Pulmonary:     (+) tobacco use, Current use, 1 packs/day,  (-) SUKH risk factors   Neurologic:  - neg neurologic ROS     Cardiovascular:     (+) Dyslipidemia ----- (-) LUND and orthopnea/PND   METS/Exercise Tolerance: 3 - Able to walk 1-2 blocks without stopping    Hematologic:  - neg hematologic  ROS  (-) history of blood clots and history of blood transfusion   Musculoskeletal: Comment: Low back pain      GI/Hepatic:     (+) GERD, Asymptomatic on medication,     Renal/Genitourinary:  - neg Renal ROS     Endo:     (+) type II DM, Last HgA1c: 8.6, date: 2/23/21, Using insulin, - not using insulin pump. Normal glucose range: 136- 240, not previously admitted for DM/DKA.     Psychiatric/Substance Use:  - neg psychiatric ROS     Infectious Disease: Comment: Completed covid vaccines in January 2021 - neg infectious disease ROS     Malignancy:   (+) Malignancy, History of Lung.  Lung CA Active status post.        Other:  - neg other ROS                                     0 lbs 0 oz  Data Unavailable   There is no height or weight on file to calculate BMI.       Physical Exam  Constitutional: Awake, alert, cooperative, no apparent distress, and appears stated age.  Neurologic: Awake, alert, oriented to name, place and time.   Neuropsychiatric: Calm, cooperative. Normal affect.     Labs: (personally reviewed)  Component      Latest Ref Rng & Units 4/1/2021   Sodium      133 - 144 mmol/L 134   Potassium      3.4 - 5.3 mmol/L 3.8   Chloride      94 - 109 mmol/L 98   Carbon Dioxide      20 - 32 mmol/L 31   Anion Gap      3 - 14 mmol/L 4   Glucose      70 - 99 mg/dL 110 (H)   Urea Nitrogen      7 - 30 mg/dL 6 (L)   Creatinine      0.52 -  1.04 mg/dL 0.55   GFR Estimate      >60 mL/min/1.73:m2 >90   GFR Estimate If Black      >60 mL/min/1.73:m2 >90   Calcium      8.5 - 10.1 mg/dL 8.8   WBC      4.0 - 11.0 10e9/L 12.1 (H)   RBC Count      3.8 - 5.2 10e12/L 4.21   Hemoglobin      11.7 - 15.7 g/dL 10.4 (L)   Hematocrit      35.0 - 47.0 % 34.2 (L)   MCV      78 - 100 fl 81   MCH      26.5 - 33.0 pg 24.7 (L)   MCHC      31.5 - 36.5 g/dL 30.4 (L)   RDW      10.0 - 15.0 % 14.5   Platelet Count      150 - 450 10e9/L 503 (H)   N-Terminal Pro Bnp      0 - 125 pg/mL 200 (H)         EK21  NSR    PFT's: pending 21 appointment.  Please see procedure tab for results once available.        Outside records reviewed from: [facility] Everywhere      ASSESSMENT and PLAN  Anya Mazariegos 60 year old female scheduled for LYMPHADENECTOMY, MEDIASTINUM, EXTENDED, TRANSCERVICAL APPROACH on 21 by Dr. Shields in evaluation of a chest wall mass in the setting of lung cancer.  PAC referral for risk assessment and optimization for anesthesia with comorbid conditions of: hyperlipidemia, tobacco use and diabetes.    Pre-operative considerations:  1.  Cardiac:  Functional status- METS 3.  She doesn't exercise purposefully, but reports she is active on her feet at work as a  and can walk a few blocks with no cardiopulmonary complication.  BNP ordered.  She has no noted cardiac conditions other than hyperlipidemia.  Intermediate risk surgery with 0.9% risk of major adverse cardiac event.   2.  Pulm:    SUKH risk: low risk.  She is a current smoker.  She was smoking 1ppd, but has been working on decreasing and is currently down to about 0.5ppd.  She has purchased nicotine inhalers and gum, but hasn't been using them consistently yet.  Continued work on smoking cessation encouraged.   Her chart has a noted diagnosis of COPD on it, but she reports she hasn't been formally diagnosed with COPD and is on no COPD meds.  She has never had a PFT, but is scheduled  for one on 4/1/21.  Please see procedure tab for PFT results once available.    3.  GI:  Risk of PONV score = 2.  If > 2, anti-emetic intervention recommended.    GERD is well controlled on omeprazole and since decreasing ibuprofen use.  4. Endo:  Diabetes is managed with lantus and metformin.  Hold metformin DOS and take only 80% of lantus.  5. Pain:  She has significant right shoulder/scapula pain thought to be related to her lung mass.  She is taking MS contin TID and oxycodone q4 hours typically around the clock for the pain.  Please see pharmD note regarding pain management.   6. Heme:  Platelet count elevated at 503 (stable) and hgb low at 10.4 (stable).      VTE risk: 1.8%    Virtual visit - Please refer to the physical examination documented by the anesthesiologist in the anesthesia record on the day of surgery      Patient is optimized and is acceptable candidate for the proposed procedure.  No further diagnostic evaluation is needed.     Visit time: 20 minutes  Documentation time: 25 minutes  ----------------------------------------------  Total time spent on DOS = 45 minutes        Ale Sheets DNP, RN, APRN  Preoperative Assessment Center  [facility]  Phone: [phone]  Fax: [phone]

## 2021-03-31 ENCOUNTER — VIRTUAL VISIT (OUTPATIENT)
Dept: PALLIATIVE CARE | Facility: CLINIC | Age: 61
End: 2021-03-31
Attending: INTERNAL MEDICINE
Payer: COMMERCIAL

## 2021-03-31 DIAGNOSIS — C34.11 MALIGNANT NEOPLASM OF UPPER LOBE OF RIGHT LUNG (H): Primary | ICD-10-CM

## 2021-03-31 DIAGNOSIS — G89.3 NEOPLASM RELATED PAIN: ICD-10-CM

## 2021-03-31 LAB — COPATH REPORT: NORMAL

## 2021-03-31 PROCEDURE — 999N001193 HC VIDEO/TELEPHONE VISIT; NO CHARGE

## 2021-03-31 PROCEDURE — 99214 OFFICE O/P EST MOD 30 MIN: CPT | Mod: 95 | Performed by: STUDENT IN AN ORGANIZED HEALTH CARE EDUCATION/TRAINING PROGRAM

## 2021-03-31 NOTE — PROGRESS NOTES
"Palliative Care Outpatient Clinic Consultation Note    Patient:  Anya Mazariegos    Chief Complaint:   The patient's primary care provider is:  No Ref-Primary, Physician.     History of Present Illness:  Anya Mazariegos 60 year old female h/o HLD, and tobacco use, underwent a thoracic MRI for scapula pain found to have a 5.5 cm R apicallung mass (EBUS on 3/17/21 biopsy positive for adenocarcinoma - NSCLC, PD-L1 positive >70%) with concern for lymphangitic spread with potential chest wall invasion. Thoracic tumor board on 3/23 recommended PFTs and meeting with Dr. Doll on 4/1 and SHARON with Dr. Espinoza on 4/5.     From Dr. Ramirez's 3/22 Note: \"The patient has either stage 2,3, or 4 NSCLC.  Dr. Cote has ordered a PET and MRI of the brain.  I have ordered genomic testing of the primary tumor.  It is 70% PD-L1 positive.  If she has stage 2 dz, surgery is the primary option and would need to be done here.  She will need PFT's for that evaluation.  The CT scan does appear to have local invasion of the third rib which would make surgery more challenging.  If she has stage 3 disease (which likely will need an EBUS and PET for evaluation), she should receive chemo/XRT followed by durvalumab, which could be done at the [facility].  If she has stage 4 dz, genomic testing may reveal a target, or otherwise she would be a good candidate for Keytruda monotherapy (as per Keynote 24) which could also be done at [facility].\"    Distressing Symptom/s:  Right shoulder or scapula pain: on MS contin 60 mg TID and oxycodone 5-10 mg q4h PRN. She is taking oxycodone 10 mg every four hours, and sets an alarm at night. The oxycodone decreases pain by 50-75%. Opioid prescriber is Marlyn Carlos PAC. Also has ibuprofen 400 mg q6h PRN, takes this 1-2 times a day. She reports right scapula and posterior deltoid pain that worsens as the day goes on, achy burning pain that is periodic. She tries to use ice packs but she " "doesn't notice much of an effect from that. When she is busy she is able to distract herself from the pain, if she forgets to set an alarm through the night she does not wake up in pain. She reports that the oxycodone makes her feel groggy and sleepy. Reviewed her . Pain is \"bearable but annoying, it wears on you\". PT, massage, chiropractor were not helpful.    Takes senna two tabs BID and a milk of Mag yesterday for bloating had a BM this morning. Works to have a BM at least every other day.     She sometimes has sudden waves of nausea without emesis about once a week. Comes on randomly not associated with standing or eating. The only medications that she takes related to this is \"tums and omeprazole\".     No issues falling or staying asleep. Noticed a decrease in appetite in the last couple of months. A piece of toast for breakfast, yogurt for lunch and a full meal for dinner. She last lost 10 lbs in the last couple of months.     Patient's Disease Understanding: She is unsure of what to expect for the plan, eager to start treatment    Coping:  \"when I think about the future I start crying and I can't quit\" feels scared    Social History  Living Situation: Lives in [facility] (2.5 hours from the [facility])  Children: 3 kids, 5 grandkids all live close by. Youngest daughter lives with her, Mayra (29 years old). Newest grandkid   Occupation:  at a local hospital up until a month ago, on leave related to pain  Hobbies: likes to sew and bake  Substance Use/History of misuse: 40 tobacco pack year history  Spiritual Background: not a regular Cheondoism goerStacia  Social History     Tobacco Use     Smoking status: Current Some Day Smoker     Packs/day: 1.00     Years: 40.00     Pack years: 40.00     Types: Cigarettes     Smokeless tobacco: Never Used   Substance Use Topics     Alcohol use: Yes     Comment: rare     Drug use: Never       Family History  Family History   Problem Relation Age of Onset " "    Chronic Obstructive Pulmonary Disease Mother      LUNG DISEASE Father         autoimmune fibrosis     Myocardial Infarction Father      Leukemia Sister         CLL     Diabetes Brother      Deep Vein Thrombosis No family hx of      Anesthesia Reaction No family hx of        Advance Care Planning:  Does not have one, reports that she has had conversations with her kids about \"not wanting to be on a ventilator forever\" Otherwise she does not have any specific limitations. Would likely chose her oldest daughter lAe to be her healthcare agent    Allergies   Allergen Reactions     Erythromycin Nausea     Current Outpatient Medications   Medication Sig Dispense Refill     atorvastatin (LIPITOR) 40 MG tablet Take 40 mg by mouth daily        blood glucose (TRUETEST) test strip Test twice daily.       ibuprofen (ADVIL/MOTRIN) 200 MG tablet Take 400 mg by mouth every 6 hours as needed        insulin glargine (LANTUS SOLOSTAR) 100 UNIT/ML pen Inject 44 Units Subcutaneous every morning        insulin pen needle (MM PEN NEEDLES) 31G X 8 MM miscellaneous Use twice daily.       metFORMIN (GLUCOPHAGE-XR) 500 MG 24 hr tablet Take 1,000 mg by mouth 2 times daily (with meals)        morphine (MS CONTIN) 60 MG 12 hr tablet Take 60 mg by mouth every 8 hours       omeprazole (PRILOSEC) 40 MG DR capsule Take 40 mg by mouth 2 times daily (before meals)       oxyCODONE (OXY-IR) 5 MG capsule Take 1-2 capsules by mouth every 4 hours as needed for severe pain        sennosides (SENOKOT) 8.6 MG tablet Take 1 tablet by mouth 2 times daily       Past Medical History:   Diagnosis Date     COPD (chronic obstructive pulmonary disease) (H)      Diabetes (H)      GERD (gastroesophageal reflux disease)      High cholesterol      Past Surgical History:   Procedure Laterality Date     APPENDECTOMY       ENDOBRONCHIAL ULTRASOUND FLEXIBLE N/A 02/25/2021    Procedure: BRONCHOSCOPY, DIAGNOSTIC, endobronchial ultrasound, transbronchial biopsies;  " Surgeon: Beto Garibay MD;  Location: [facility] OR     ORTHOPEDIC SURGERY Right     ankle ORIF       REVIEW OF SYSTEMS:   ROS: 10 point ROS neg other than the symptoms noted above in the HPI and here:  Palliative Symptom Review (0=no symptom/no concern, 1=mild, 2=moderate, 3=severe):      Pain: 2      Fatigue: 0      Nausea: 1      Constipation: 1      Diarrhea: 0      Depressive Symptoms: 0      Anxiety: 0      Drowsiness: 0      Poor Appetite: 1      Shortness of Breath: 0      Insomnia: 0      Other: 0      Overall (0 good/no concerns, 3 very poor):  1    Physical Exam:  Gen alert, comfortable appearing, NAD.   Head NCAT.  Eyes anicteric without injection  Face symmetric, eyes conjugate  Mouth pink, moist appearing  Lungs unlabored, no cough, speaking full sentences  Skin no rashes or lesions evident on face/neck  Neuro Face symmetric, eyes conjugate; speech fluent.  Neuropsych exam normal including affect, sensorium, gross memory, thought processes, and fund of knowledge.     Data Reviewed:  LABS: 3/14 Cr 0.54 Alb 3.8  IMAGING: 3/30/2021 PET: Impression:   1. Interval increase in size and FDG activity within the large   posterior right upper lobe site of the patient's known lung   malignancy with pleural and chest wall invasion.  2. Mild low-grade FDG activity within the 1.2 cm right paratracheal lymph node.  3. No evidence of distant metastasis.  3/30/2021: Brain MRI from OSH: normal      Impressions & Recommendations:  Anya Mazariegos 60 year old female h/o HLD, and tobacco use, underwent a thoracic MRI for scapula pain found to have a 5.5 cm R apicallung mass (EBUS on 3/17/21 biopsy positive for adenocarcinoma - NSCLC, PD-L1 positive >70%) with concern for lymphangitic spread with potential chest wall invasion. Thoracic tumor board on 3/23 recommended PFTs and meeting with Dr. Doll on 4/1 and SHARON with Dr. Espinoza on 4/5.     #R scapula and posterior deltoid pain:   -continue MS Contin 60 mg TID and oxycodone 5-10  mg q4h PRN, ibuprofen 400 mg q6h PRN. Has a positive relationship with RIGOBERTO Arnett and would like to continue to have her opioids prescribed by them. Recommended starting scheduled acetaminophen 1,000 mg TID and applying topical OTC lidocaine patchs PRN  -we discussed that we expect her pain to change depending the treatment plan dictated by her stage (4/5 TEMLA). If she has a surgical resection vs chemo/XRT vs immunotherapy we expect her pain to improve or at least evolve in character. If she continues to have persistent pain we would consider recommending switching her MS Contin to Methadone. It can take several weeks to titrate up to the appropriate dose of Methadone so we would not recommend this change now. Could also consider starting gabapentin, we did not elect to do this today given her concerns about drowsiness with her current opioid plan.     #Nausea: not interested in PRN medications for this at this time.     #ACP: discussed completing a healthcare directive with her children when we know her stage    Plan for follow up in one month    Patient seen and discussed with Dr. Lisa Diaz MD  Palliative Care Fellow [phone]    My clinic is in the [facility]: [phone] (scheduling); [phone] (triage). Palliative After-Hours Answering Service: [phone].     Attending Note:  Patient seen and evaluated with Dr Diaz and I agree with/confirm their findings/recs in this note.      Thank you for involving us in the patient's care.   Bishnu Gonzalez MD / Palliative Medicine / Text me via [facility].

## 2021-03-31 NOTE — LETTER
"3/31/2021       RE: Anya Mazariegos  [address] [facility] [address]     Dear Colleague,    Thank you for referring your patient, An[facility]azariegos[facility]. Please see a copy of my visit note below.    Palliative Care Outpatient Clinic Consultation Note    Patient:  Anya Mazariegos    Chief Complaint:   The patient's primary care provider is:  No Ref-Primary, Physician.     History of Present Illness:  Anya Mazariegos 60 year old female h/o HLD, and tobacco use, underwent a thoracic MRI for scapula pain found to have a 5.5 cm R apicallung mass (EBUS on 3/17/21 biopsy positive for adenocarcinoma - NSCLC, PD-L1 positive >70%) with concern for lymphangitic spread with potential chest wall invasion. Thoracic tumor board on 3/23 recommended PFTs and meeting with Dr. Doll on 4/1 and SHARON with Dr. Espinoza on 4/5.     From Dr. Ramirez's 3/22 Note: \"The patient has either stage 2,3, or 4 NSCLC.  Dr. Cote has ordered a PET and MRI of the brain.  I have ordered genomic testing of the primary tumor.  It is 70% PD-L1 positive.  If she has stage 2 dz, surgery is the primary option and would need to be done here.  She will need PFT's for that evaluation.  The CT scan does appear to have local invasion of the third rib which would make surgery more challenging.  If she has stage 3 disease (which likely will need an EBUS and PET for evaluation), she should receive chemo/XRT followed by durvalumab, which could be done at the [facility].  If she has stage 4 dz, genomic testing may reveal a target, or otherwise she would be a good candidate for Keytruda monotherapy (as per Keynote 24) which could also be done at [facility].\"    Distressing Symptom/s:  Right shoulder or scapula pain: on MS contin 60 mg TID and oxycodone 5-10 mg q4h PRN. She is taking oxycodone 10 mg every four hours, and sets an alarm at night. The oxycodone " "decreases pain by 50-75%. Opioid prescriber is [facility] Marlyn Ramsay PAC. Also has ibuprofen 400 mg q6h PRN, takes this 1-2 times a day. She reports right scapula and posterior deltoid pain that worsens as the day goes on, achy burning pain that is periodic. She tries to use ice packs but she doesn't notice much of an effect from that. When she is busy she is able to distract herself from the pain, if she forgets to set an alarm through the night she does not wake up in pain. She reports that the oxycodone makes her feel groggy and sleepy. Reviewed her . Pain is \"bearable but annoying, it wears on you\". PT, massage, chiropractor were not helpful.    Takes senna two tabs BID and a milk of Mag yesterday for bloating had a BM this morning. Works to have a BM at least every other day.     She sometimes has sudden waves of nausea without emesis about once a week. Comes on randomly not associated with standing or eating. The only medications that she takes related to this is \"tums and omeprazole\".     No issues falling or staying asleep. Noticed a decrease in appetite in the last couple of months. A piece of toast for breakfast, yogurt for lunch and a full meal for dinner. She last lost 10 lbs in the last couple of months.     Patient's Disease Understanding: She is unsure of what to expect for the plan, eager to start treatment    Coping:  \"when I think about the future I start crying and I can't quit\" feels scared    Social History  Living Situation: Lives in [facility] ([place]5 hours from the [facility])  Children: 3 kids, 5 grandkids all live close by. Youngest daughter lives with her, Mayra (29 years old). Newest grandkid   Occupation:  at a local hospital up until a month ago, on leave related to pain  Hobbies: likes to sew and bake  Substance Use/History of misuse: 40 tobacco pack year history  Spiritual Background: not a regular Christian goer, Latter day  Social History     Tobacco Use     Smoking " "status: Current Some Day Smoker     Packs/day: 1.00     Years: 40.00     Pack years: 40.00     Types: Cigarettes     Smokeless tobacco: Never Used   Substance Use Topics     Alcohol use: Yes     Comment: rare     Drug use: Never       Family History  Family History   Problem Relation Age of Onset     Chronic Obstructive Pulmonary Disease Mother      LUNG DISEASE Father         autoimmune fibrosis     Myocardial Infarction Father      Leukemia Sister         CLL     Diabetes Brother      Deep Vein Thrombosis No family hx of      Anesthesia Reaction No family hx of        Advance Care Planning:  Does not have one, reports that she has had conversations with her kids about \"not wanting to be on a ventilator forever\" Otherwise she does not have any specific limitations. Would likely chose her oldest daughter Ale to be her healthcare agent    Allergies   Allergen Reactions     Erythromycin Nausea     Current Outpatient Medications   Medication Sig Dispense Refill     atorvastatin (LIPITOR) 40 MG tablet Take 40 mg by mouth daily        blood glucose (TRUETEST) test strip Test twice daily.       ibuprofen (ADVIL/MOTRIN) 200 MG tablet Take 400 mg by mouth every 6 hours as needed        insulin glargine (LANTUS SOLOSTAR) 100 UNIT/ML pen Inject 44 Units Subcutaneous every morning        insulin pen needle (MM PEN NEEDLES) 31G X 8 MM miscellaneous Use twice daily.       metFORMIN (GLUCOPHAGE-XR) 500 MG 24 hr tablet Take 1,000 mg by mouth 2 times daily (with meals)        morphine (MS CONTIN) 60 MG 12 hr tablet Take 60 mg by mouth every 8 hours       omeprazole (PRILOSEC) 40 MG DR capsule Take 40 mg by mouth 2 times daily (before meals)       oxyCODONE (OXY-IR) 5 MG capsule Take 1-2 capsules by mouth every 4 hours as needed for severe pain        sennosides (SENOKOT) 8.6 MG tablet Take 1 tablet by mouth 2 times daily       Past Medical History:   Diagnosis Date     COPD (chronic obstructive pulmonary disease) (H)      " Diabetes (H)      GERD (gastroesophageal reflux disease)      High cholesterol      Past Surgical History:   Procedure Laterality Date     APPENDECTOMY       ENDOBRONCHIAL ULTRASOUND FLEXIBLE N/A 02/25/2021    Procedure: BRONCHOSCOPY, DIAGNOSTIC, endobronchial ultrasound, transbronchial biopsies;  Surgeon: Beto Garibay MD;  Location: [facility] OR     ORTHOPEDIC SURGERY Right     ankle ORIF       REVIEW OF SYSTEMS:   ROS: 10 point ROS neg other than the symptoms noted above in the HPI and here:  Palliative Symptom Review (0=no symptom/no concern, 1=mild, 2=moderate, 3=severe):      Pain: 2      Fatigue: 0      Nausea: 1      Constipation: 1      Diarrhea: 0      Depressive Symptoms: 0      Anxiety: 0      Drowsiness: 0      Poor Appetite: 1      Shortness of Breath: 0      Insomnia: 0      Other: 0      Overall (0 good/no concerns, 3 very poor):  1    Physical Exam:  Gen alert, comfortable appearing, NAD.   Head NCAT.  Eyes anicteric without injection  Face symmetric, eyes conjugate  Mouth pink, moist appearing  Lungs unlabored, no cough, speaking full sentences  Skin no rashes or lesions evident on face/neck  Neuro Face symmetric, eyes conjugate; speech fluent.  Neuropsych exam normal including affect, sensorium, gross memory, thought processes, and fund of knowledge.     Data Reviewed:  LABS: 3/14 Cr 0.54 Alb 3.8  IMAGING: 3/30/2021 PET: Impression:   1. Interval increase in size and FDG activity within the large   posterior right upper lobe site of the patient's known lung   malignancy with pleural and chest wall invasion.  2. Mild low-grade FDG activity within the 1.2 cm right paratracheal lymph node.  3. No evidence of distant metastasis.  3/30/2021: Brain MRI from OSH: normal      Impressions & Recommendations:  Anya Mazariegos 60 year old female h/o HLD, and tobacco use, underwent a thoracic MRI for scapula pain found to have a 5.5 cm R apicallung mass (EBUS on 3/17/21 biopsy positive for adenocarcinoma - NSCLC,  PD-L1 positive >70%) with concern for lymphangitic spread with potential chest wall invasion. Thoracic tumor board on 3/23 recommended PFTs and meeting with Dr. Doll on 4/1 and TEMLA with Dr. Espinoza on 4/5.     #R scapula and posterior deltoid pain:   -continue MS Contin 60 mg TID and oxycodone 5-10 mg q4h PRN, ibuprofen 400 mg q6h PRN. Has a positive relationship with RIGOBERTO Arnett and would like to continue to have her opioids prescribed by them. Recommended starting scheduled acetaminophen 1,000 mg TID and applying topical OTC lidocaine patchs PRN  -we discussed that we expect her pain to change depending the treatment plan dictated by her stage (4/5 TEMLA). If she has a surgical resection vs chemo/XRT vs immunotherapy we expect her pain to improve or at least evolve in character. If she continues to have persistent pain we would consider recommending switching her MS Contin to Methadone. It can take several weeks to titrate up to the appropriate dose of Methadone so we would not recommend this change now. Could also consider starting gabapentin, we did not elect to do this today given her concerns about drowsiness with her current opioid plan.     #Nausea: not interested in PRN medications for this at this time.     #ACP: discussed completing a healthcare directive with her children when we know her stage    Plan for follow up in one month    Patient seen and discussed with Dr. Lisa Diaz MD  Palliative Care Fellow [id number]    My clinic is in the [facility]: [phone] (scheduling); [phone] (triage). Palliative After-Hours Answering Service: [phone].     Attending Note:  Patient seen and evaluated with Dr Diaz and I agree with/confirm their findings/recs in this note.      Thank you for involving us in the patient's care.   Bishnu Gonzalez MD / Palliative Medicine / Text me via [facility].      Anya is a 60 year old who is being evaluated via a billable video visit.      How would you like to obtain your  "AVS? MyCtrinht  If the video visit is dropped, the invitation should be resent by: Text to cell phone: [phone]  Will anyone else be joining your video visit? No     Vitals - Patient Reported  Weight (Patient Reported): 61.2 kg (135 lb)  Height (Patient Reported): 165.1 cm (5' 5\")  BMI (Based on Pt Reported Ht/Wt): 22.46  Pain Score: Moderate Pain (4)(OUTSIDE SHOULDER BLADE)  Pain Loc: Other - see comment    Aruna Ayala CMA March 31, 2021  12:09 PM     Video-Visit Details    Type of service:  Video Visit    Video Start Time: 12:45  Video End Time:13:35    Originating Location (pt. Location): Home    Distant Location (provider location):  [facility]     Platform used for Video Visit: Mily      "

## 2021-03-31 NOTE — PROGRESS NOTES
"Anya is a 60 year old who is being evaluated via a billable video visit.      How would you like to obtain your AVS? MyChart  If the video visit is dropped, the invitation should be resent by: Text to cell phone: [phone]  Will anyone else be joining your video visit? No     Vitals - Patient Reported  Weight (Patient Reported): 61.2 kg (135 lb)  Height (Patient Reported): 165.1 cm (5' 5\")  BMI (Based on Pt Reported Ht/Wt): 22.46  Pain Score: Moderate Pain (4)(OUTSIDE SHOULDER BLADE)  Pain Loc: Other - see comment    Aruna Ayala, SIERRA March 31, 2021  12:09 PM         Video Start Time: 12:45  Video-Visit Details    Type of service:  Video Visit    Video End Time:13:35    Originating Location (pt. Location): Home    Distant Location (provider location):  [facility]     Platform used for Video Visit: Mily  "

## 2021-04-01 ENCOUNTER — OFFICE VISIT (OUTPATIENT)
Dept: SURGERY | Facility: CLINIC | Age: 61
End: 2021-04-01
Attending: STUDENT IN AN ORGANIZED HEALTH CARE EDUCATION/TRAINING PROGRAM
Payer: COMMERCIAL

## 2021-04-01 VITALS
OXYGEN SATURATION: 99 % | WEIGHT: 133.8 LBS | DIASTOLIC BLOOD PRESSURE: 70 MMHG | TEMPERATURE: 98.4 F | HEART RATE: 93 BPM | BODY MASS INDEX: 22.27 KG/M2 | SYSTOLIC BLOOD PRESSURE: 128 MMHG

## 2021-04-01 DIAGNOSIS — Z01.818 PREOP EXAMINATION: ICD-10-CM

## 2021-04-01 DIAGNOSIS — R91.8 LUNG MASS: Primary | ICD-10-CM

## 2021-04-01 DIAGNOSIS — Z11.59 ENCOUNTER FOR SCREENING FOR OTHER VIRAL DISEASES: ICD-10-CM

## 2021-04-01 DIAGNOSIS — C34.11 MALIGNANT NEOPLASM OF UPPER LOBE OF RIGHT LUNG (H): ICD-10-CM

## 2021-04-01 LAB
ANION GAP SERPL CALCULATED.3IONS-SCNC: 4 MMOL/L (ref 3–14)
BUN SERPL-MCNC: 6 MG/DL (ref 7–30)
CALCIUM SERPL-MCNC: 8.8 MG/DL (ref 8.5–10.1)
CHLORIDE SERPL-SCNC: 98 MMOL/L (ref 94–109)
CO2 SERPL-SCNC: 31 MMOL/L (ref 20–32)
COPATH REPORT: NORMAL
CREAT SERPL-MCNC: 0.55 MG/DL (ref 0.52–1.04)
ERYTHROCYTE [DISTWIDTH] IN BLOOD BY AUTOMATED COUNT: 14.5 % (ref 10–15)
GFR SERPL CREATININE-BSD FRML MDRD: >90 ML/MIN/{1.73_M2}
GLUCOSE SERPL-MCNC: 110 MG/DL (ref 70–99)
HCT VFR BLD AUTO: 34.2 % (ref 35–47)
HGB BLD-MCNC: 10.4 G/DL (ref 11.7–15.7)
LABORATORY COMMENT REPORT: NORMAL
MCH RBC QN AUTO: 24.7 PG (ref 26.5–33)
MCHC RBC AUTO-ENTMCNC: 30.4 G/DL (ref 31.5–36.5)
MCV RBC AUTO: 81 FL (ref 78–100)
NT-PROBNP SERPL-MCNC: 200 PG/ML (ref 0–125)
PLATELET # BLD AUTO: 503 10E9/L (ref 150–450)
POTASSIUM SERPL-SCNC: 3.8 MMOL/L (ref 3.4–5.3)
RBC # BLD AUTO: 4.21 10E12/L (ref 3.8–5.2)
SARS-COV-2 RNA RESP QL NAA+PROBE: NEGATIVE
SARS-COV-2 RNA RESP QL NAA+PROBE: NORMAL
SODIUM SERPL-SCNC: 134 MMOL/L (ref 133–144)
SPECIMEN SOURCE: NORMAL
SPECIMEN SOURCE: NORMAL
WBC # BLD AUTO: 12.1 10E9/L (ref 4–11)

## 2021-04-01 PROCEDURE — G0463 HOSPITAL OUTPT CLINIC VISIT: HCPCS

## 2021-04-01 PROCEDURE — 86923 COMPATIBILITY TEST ELECTRIC: CPT | Mod: 90 | Performed by: PATHOLOGY

## 2021-04-01 PROCEDURE — 94729 DIFFUSING CAPACITY: CPT | Performed by: INTERNAL MEDICINE

## 2021-04-01 PROCEDURE — 80048 BASIC METABOLIC PNL TOTAL CA: CPT | Performed by: PATHOLOGY

## 2021-04-01 PROCEDURE — 99204 OFFICE O/P NEW MOD 45 MIN: CPT | Performed by: STUDENT IN AN ORGANIZED HEALTH CARE EDUCATION/TRAINING PROGRAM

## 2021-04-01 PROCEDURE — U0005 INFEC AGEN DETEC AMPLI PROBE: HCPCS | Mod: 90 | Performed by: PATHOLOGY

## 2021-04-01 PROCEDURE — 86850 RBC ANTIBODY SCREEN: CPT | Mod: 90 | Performed by: PATHOLOGY

## 2021-04-01 PROCEDURE — 94060 EVALUATION OF WHEEZING: CPT | Performed by: INTERNAL MEDICINE

## 2021-04-01 PROCEDURE — 86900 BLOOD TYPING SEROLOGIC ABO: CPT | Mod: 90 | Performed by: PATHOLOGY

## 2021-04-01 PROCEDURE — U0003 INFECTIOUS AGENT DETECTION BY NUCLEIC ACID (DNA OR RNA); SEVERE ACUTE RESPIRATORY SYNDROME CORONAVIRUS 2 (SARS-COV-2) (CORONAVIRUS DISEASE [COVID-19]), AMPLIFIED PROBE TECHNIQUE, MAKING USE OF HIGH THROUGHPUT TECHNOLOGIES AS DESCRIBED BY CMS-2020-01-R: HCPCS | Mod: 90 | Performed by: PATHOLOGY

## 2021-04-01 PROCEDURE — 85027 COMPLETE CBC AUTOMATED: CPT | Performed by: PATHOLOGY

## 2021-04-01 PROCEDURE — 94726 PLETHYSMOGRAPHY LUNG VOLUMES: CPT | Performed by: INTERNAL MEDICINE

## 2021-04-01 PROCEDURE — 86901 BLOOD TYPING SEROLOGIC RH(D): CPT | Mod: 90 | Performed by: PATHOLOGY

## 2021-04-01 PROCEDURE — 83880 ASSAY OF NATRIURETIC PEPTIDE: CPT | Performed by: PATHOLOGY

## 2021-04-01 PROCEDURE — 36415 COLL VENOUS BLD VENIPUNCTURE: CPT | Performed by: PATHOLOGY

## 2021-04-01 ASSESSMENT — PAIN SCALES - GENERAL: PAINLEVEL: SEVERE PAIN (6)

## 2021-04-01 NOTE — LETTER
4/1/2021         RE: Anya Mazariegos  [address] [facility] [address]        Dear Colleague,    Thank you for referring your patient, Anya Mazariegos, to the [facility]. Please see a copy of my visit note below.    THORACIC SURGERY - NEW PATIENT OFFICE VISIT          I saw Delilah at Dr. Ramirez s request in consultation for the evaluation and treatment of a RUL lung mass.     HPI  Ms. Anya Mazariegos is a 60 year old woman with a newly diagnosed RUL lung cancer. SHe had been suffering with right shoulder pain for a few months and investigations finally revealed a large right apical mass which was biopsy positive for lung adenocarcinoma. SHe has no neurological symtpoms. Denies other systemic symptoms.                                     Previsit Tests   PET/CT: 3/30/21: uptake in mass and 4R node    PMH    MRI brain: no mets      Past Medical History:   Diagnosis Date     COPD (chronic obstructive pulmonary disease) (H)      Diabetes (H)      GERD (gastroesophageal reflux disease)      High cholesterol         PSH      Past Surgical History:   Procedure Laterality Date     APPENDECTOMY       ENDOBRONCHIAL ULTRASOUND FLEXIBLE N/A 02/25/2021    Procedure: BRONCHOSCOPY, DIAGNOSTIC, endobronchial ultrasound, transbronchial biopsies;  Surgeon: Beto Garibay MD;  Location: [facility] OR     ORTHOPEDIC SURGERY Right     ankle ORIF        ETOH deneis   TOB still smoking 1/2 PPD     Physical examination  BMI       From a personal perspective, she is here with her daughter today. SHe is quite fucntional at baseline. SHe has a new grandson who she is helping take care of at times.    IMPRESSION (R91.8) Lung mass  (primary encounter diagnosis)  Comment:   Plan: Tobacco Treatment Clinic Referral             60 year old year-old female with with a cT3/4 N2 M0 lung cancer    PLAN  I spent 45 min on the date of the encounter in chart review, patient visit, review of tests, documentation and/or  discussion with other providers about the issues documented above. I reviewed the plan as follows:  Procedure planned: Transcervical Extended Mediastinal Lymphadenectomy.   I reviewed the indications, risks, and benefits of the procedure with Ms. Anya Mazariegos. We discussed the intraoperative risks of major bleeding needing sternotomy, and injury to vital organs, potential postoperative complications including, but not limited to, major respiratory events, arrhythmia, bleeding, infection, reoperation, and death. I explained the anticipated hospital course (+/- 0-1 days) and postoperative recovery     We discussed the rationale for the procedure- the need to define mediastinal jam involvement to be able to plan for treatment with or without surgery  .  Consent: pending   Necessary Preop Tests & Appointments:   PAC  PFTs  Regional Anesthesia Plan:   Lcoal  Anticoagulation Plan: lovenox in holding and post op  Smoking Cessation:   Ms. Mazariegos was counseled on the importance of smoking cessation and its impact on the oj-operative course. The patient is strongly encouraged to quit smoking for 3 weeks prior to their procedure. If they feel they're absolutely unable to quit, we will proceed as planned given the malignant nature of their disease.  This guideline is in accordance with the World Health Organization (WHO) and has shown to lower the risk of both surgical and anesthesia complications as well as improve post-operative outcomes.     All questions were answered and Anya Mazariegos and present family were in agreement with the plan.  I appreciate the opportunity to participate in the care of your patient and will keep you updated.  Sincerely,  Daly Doll MD

## 2021-04-01 NOTE — NURSING NOTE
"Oncology Rooming Note    April 1, 2021 11:33 AM   Anya Mazariegos is a 60 year old female who presents for:    Chief Complaint   Patient presents with     Oncology Clinic Visit     chest wall mass; lung mass     Initial Vitals: /70   Pulse 93   Temp 98.4  F (36.9  C) (Oral)   Wt 60.7 kg (133 lb 12.8 oz)   SpO2 99%   BMI 22.27 kg/m   Estimated body mass index is 22.27 kg/m  as calculated from the following:    Height as of 2/25/21: 1.651 m (5' 5\").    Weight as of this encounter: 60.7 kg (133 lb 12.8 oz). Body surface area is 1.67 meters squared.  Severe Pain (6) Comment: Data Unavailable   No LMP recorded. Patient is postmenopausal.  Allergies reviewed: Yes  Medications reviewed: Yes    Medications: Medication refills not needed today.  Pharmacy name entered into EPIC: [id number] - [place], [place] - [facility] [address]    Clinical concerns: none       Viktoria Thayer CMA            "

## 2021-04-01 NOTE — PROGRESS NOTES
THORACIC SURGERY - NEW PATIENT OFFICE VISIT          I saw Delilah at Dr. Ramirez s request in consultation for the evaluation and treatment of a RUL lung mass.     HPI  Ms. Anya Mazariegos is a 60 year old woman with a newly diagnosed RUL lung cancer. SHe had been suffering with right shoulder pain for a few months and investigations finally revealed a large right apical mass which was biopsy positive for lung adenocarcinoma. SHe has no neurological symtpoms. Denies other systemic symptoms.                                     Previsit Tests   PET/CT: 3/30/21: uptake in mass and 4R node    PMH    MRI brain: no mets      Past Medical History:   Diagnosis Date     COPD (chronic obstructive pulmonary disease) (H)      Diabetes (H)      GERD (gastroesophageal reflux disease)      High cholesterol         PSH      Past Surgical History:   Procedure Laterality Date     APPENDECTOMY       ENDOBRONCHIAL ULTRASOUND FLEXIBLE N/A 02/25/2021    Procedure: BRONCHOSCOPY, DIAGNOSTIC, endobronchial ultrasound, transbronchial biopsies;  Surgeon: Beto Garibay MD;  Location: [facility] OR     ORTHOPEDIC SURGERY Right     ankle ORIF        ETOH deneis   TOB still smoking 1/2 PPD     Physical examination  BMI       From a personal perspective, she is here with her daughter today. SHe is quite fucntional at baseline. SHe has a new grandson who she is helping take care of at times.    IMPRESSION (R91.8) Lung mass  (primary encounter diagnosis)  Comment:   Plan: Tobacco Treatment Clinic Referral             60 year old year-old female with with a cT3/4 N2 M0 lung cancer    PLAN  I spent 45 min on the date of the encounter in chart review, patient visit, review of tests, documentation and/or discussion with other providers about the issues documented above. I reviewed the plan as follows:  Procedure planned: Transcervical Extended Mediastinal Lymphadenectomy.   I reviewed the indications, risks, and benefits of the procedure with Ms. Anya OSBORNE  Delilah. We discussed the intraoperative risks of major bleeding needing sternotomy, and injury to vital organs, potential postoperative complications including, but not limited to, major respiratory events, arrhythmia, bleeding, infection, reoperation, and death. I explained the anticipated hospital course (+/- 0-1 days) and postoperative recovery     We discussed the rationale for the procedure- the need to define mediastinal jam involvement to be able to plan for treatment with or without surgery  .  Consent: pending   Necessary Preop Tests & Appointments:   PAC  PFTs  Regional Anesthesia Plan:   Lcoal  Anticoagulation Plan: lovenox in holding and post op  Smoking Cessation:   Ms. Mazariegos was counseled on the importance of smoking cessation and its impact on the oj-operative course. The patient is strongly encouraged to quit smoking for 3 weeks prior to their procedure. If they feel they're absolutely unable to quit, we will proceed as planned given the malignant nature of their disease.  This guideline is in accordance with the World Health Organization (WHO) and has shown to lower the risk of both surgical and anesthesia complications as well as improve post-operative outcomes.     All questions were answered and Anya Mazariegos and present family were in agreement with the plan.  I appreciate the opportunity to participate in the care of your patient and will keep you updated.  Sincerely,    Daly Doll MD

## 2021-04-02 NOTE — RESULT ENCOUNTER NOTE
Nguyễn Joyner,    Your test results are attached.  Your labs are stable and okay for surgery.         Ale Sheets DNP, RN, ANP-C

## 2021-04-05 ENCOUNTER — ANESTHESIA (OUTPATIENT)
Dept: SURGERY | Facility: CLINIC | Age: 61
End: 2021-04-05
Payer: COMMERCIAL

## 2021-04-05 ENCOUNTER — APPOINTMENT (OUTPATIENT)
Dept: GENERAL RADIOLOGY | Facility: CLINIC | Age: 61
End: 2021-04-05
Attending: THORACIC SURGERY (CARDIOTHORACIC VASCULAR SURGERY)
Payer: COMMERCIAL

## 2021-04-05 ENCOUNTER — HOSPITAL ENCOUNTER (OUTPATIENT)
Facility: CLINIC | Age: 61
Discharge: HOME | End: 2021-04-05
Attending: THORACIC SURGERY (CARDIOTHORACIC VASCULAR SURGERY) | Admitting: THORACIC SURGERY (CARDIOTHORACIC VASCULAR SURGERY)
Payer: COMMERCIAL

## 2021-04-05 VITALS
TEMPERATURE: 98.3 F | SYSTOLIC BLOOD PRESSURE: 131 MMHG | HEIGHT: 65 IN | DIASTOLIC BLOOD PRESSURE: 62 MMHG | BODY MASS INDEX: 21.01 KG/M2 | RESPIRATION RATE: 16 BRPM | WEIGHT: 126.1 LBS | OXYGEN SATURATION: 98 % | HEART RATE: 88 BPM

## 2021-04-05 DIAGNOSIS — R22.2 CHEST WALL MASS: Primary | ICD-10-CM

## 2021-04-05 DIAGNOSIS — R22.2 CHEST WALL MASS: ICD-10-CM

## 2021-04-05 LAB
ABO + RH BLD: NORMAL
ABO + RH BLD: NORMAL
BLD GP AB SCN SERPL QL: NORMAL
BLD PROD TYP BPU: NORMAL
BLOOD BANK CMNT PATIENT-IMP: NORMAL
GLUCOSE BLDC GLUCOMTR-MCNC: 122 MG/DL (ref 70–99)
GLUCOSE BLDC GLUCOMTR-MCNC: 77 MG/DL (ref 70–99)
NUM BPU REQUESTED: 2
SPECIMEN EXP DATE BLD: NORMAL

## 2021-04-05 PROCEDURE — 88331 PATH CONSLTJ SURG 1 BLK 1SPC: CPT | Mod: TC | Performed by: THORACIC SURGERY (CARDIOTHORACIC VASCULAR SURGERY)

## 2021-04-05 PROCEDURE — 272N000001 HC OR GENERAL SUPPLY STERILE: Performed by: THORACIC SURGERY (CARDIOTHORACIC VASCULAR SURGERY)

## 2021-04-05 PROCEDURE — 250N000013 HC RX MED GY IP 250 OP 250 PS 637: Performed by: ANESTHESIOLOGY

## 2021-04-05 PROCEDURE — 250N000011 HC RX IP 250 OP 636: Performed by: ANESTHESIOLOGY

## 2021-04-05 PROCEDURE — 88305 TISSUE EXAM BY PATHOLOGIST: CPT | Mod: 26 | Performed by: PATHOLOGY

## 2021-04-05 PROCEDURE — 258N000003 HC RX IP 258 OP 636: Performed by: NURSE ANESTHETIST, CERTIFIED REGISTERED

## 2021-04-05 PROCEDURE — 250N000025 HC SEVOFLURANE, PER MIN: Performed by: THORACIC SURGERY (CARDIOTHORACIC VASCULAR SURGERY)

## 2021-04-05 PROCEDURE — 250N000011 HC RX IP 250 OP 636: Performed by: STUDENT IN AN ORGANIZED HEALTH CARE EDUCATION/TRAINING PROGRAM

## 2021-04-05 PROCEDURE — 370N000017 HC ANESTHESIA TECHNICAL FEE, PER MIN: Performed by: THORACIC SURGERY (CARDIOTHORACIC VASCULAR SURGERY)

## 2021-04-05 PROCEDURE — 999N000015 HC STATISTIC ARTERIAL MONITORING DAILY

## 2021-04-05 PROCEDURE — 710N000010 HC RECOVERY PHASE 1, LEVEL 2, PER MIN: Performed by: THORACIC SURGERY (CARDIOTHORACIC VASCULAR SURGERY)

## 2021-04-05 PROCEDURE — 999N000141 HC STATISTIC PRE-PROCEDURE NURSING ASSESSMENT: Performed by: THORACIC SURGERY (CARDIOTHORACIC VASCULAR SURGERY)

## 2021-04-05 PROCEDURE — 82962 GLUCOSE BLOOD TEST: CPT

## 2021-04-05 PROCEDURE — 88332 PATH CONSLTJ SURG EA ADD BLK: CPT | Mod: TC,XS | Performed by: THORACIC SURGERY (CARDIOTHORACIC VASCULAR SURGERY)

## 2021-04-05 PROCEDURE — 88305 TISSUE EXAM BY PATHOLOGIST: CPT | Mod: TC | Performed by: THORACIC SURGERY (CARDIOTHORACIC VASCULAR SURGERY)

## 2021-04-05 PROCEDURE — 250N000009 HC RX 250: Performed by: NURSE ANESTHETIST, CERTIFIED REGISTERED

## 2021-04-05 PROCEDURE — 999N000157 HC STATISTIC RCP TIME EA 10 MIN

## 2021-04-05 PROCEDURE — 250N000011 HC RX IP 250 OP 636: Performed by: NURSE ANESTHETIST, CERTIFIED REGISTERED

## 2021-04-05 PROCEDURE — 71045 X-RAY EXAM CHEST 1 VIEW: CPT | Mod: 26 | Performed by: STUDENT IN AN ORGANIZED HEALTH CARE EDUCATION/TRAINING PROGRAM

## 2021-04-05 PROCEDURE — 88332 PATH CONSLTJ SURG EA ADD BLK: CPT | Mod: 26 | Performed by: PATHOLOGY

## 2021-04-05 PROCEDURE — 999N000065 XR CHEST PORT 1 VW

## 2021-04-05 PROCEDURE — 88331 PATH CONSLTJ SURG 1 BLK 1SPC: CPT | Mod: 26 | Performed by: PATHOLOGY

## 2021-04-05 PROCEDURE — 258N000001 HC RX 258: Performed by: ANESTHESIOLOGY

## 2021-04-05 PROCEDURE — 710N000012 HC RECOVERY PHASE 2, PER MINUTE: Performed by: THORACIC SURGERY (CARDIOTHORACIC VASCULAR SURGERY)

## 2021-04-05 PROCEDURE — 250N000009 HC RX 250: Performed by: THORACIC SURGERY (CARDIOTHORACIC VASCULAR SURGERY)

## 2021-04-05 PROCEDURE — 360N000077 HC SURGERY LEVEL 4, PER MIN: Performed by: THORACIC SURGERY (CARDIOTHORACIC VASCULAR SURGERY)

## 2021-04-05 PROCEDURE — 258N000003 HC RX IP 258 OP 636: Performed by: ANESTHESIOLOGY

## 2021-04-05 RX ORDER — IBUPROFEN 400 MG/1
400 TABLET, FILM COATED ORAL EVERY 8 HOURS PRN
Qty: 12 TABLET | Refills: 0 | Status: SHIPPED | OUTPATIENT
Start: 2021-04-05 | End: 2021-04-09

## 2021-04-05 RX ORDER — FENTANYL CITRATE 50 UG/ML
25-50 INJECTION, SOLUTION INTRAMUSCULAR; INTRAVENOUS
Status: DISCONTINUED | OUTPATIENT
Start: 2021-04-05 | End: 2021-04-05 | Stop reason: HOSPADM

## 2021-04-05 RX ORDER — SODIUM CHLORIDE, SODIUM LACTATE, POTASSIUM CHLORIDE, CALCIUM CHLORIDE 600; 310; 30; 20 MG/100ML; MG/100ML; MG/100ML; MG/100ML
INJECTION, SOLUTION INTRAVENOUS CONTINUOUS PRN
Status: DISCONTINUED | OUTPATIENT
Start: 2021-04-05 | End: 2021-04-05

## 2021-04-05 RX ORDER — ONDANSETRON 2 MG/ML
INJECTION INTRAMUSCULAR; INTRAVENOUS PRN
Status: DISCONTINUED | OUTPATIENT
Start: 2021-04-05 | End: 2021-04-05

## 2021-04-05 RX ORDER — ONDANSETRON 4 MG/1
4 TABLET, ORALLY DISINTEGRATING ORAL EVERY 30 MIN PRN
Status: DISCONTINUED | OUTPATIENT
Start: 2021-04-05 | End: 2021-04-05 | Stop reason: HOSPADM

## 2021-04-05 RX ORDER — BUPIVACAINE HYDROCHLORIDE AND EPINEPHRINE 2.5; 5 MG/ML; UG/ML
INJECTION, SOLUTION INFILTRATION; PERINEURAL PRN
Status: DISCONTINUED | OUTPATIENT
Start: 2021-04-05 | End: 2021-04-05 | Stop reason: HOSPADM

## 2021-04-05 RX ORDER — ONDANSETRON 2 MG/ML
4 INJECTION INTRAMUSCULAR; INTRAVENOUS EVERY 30 MIN PRN
Status: DISCONTINUED | OUTPATIENT
Start: 2021-04-05 | End: 2021-04-05 | Stop reason: HOSPADM

## 2021-04-05 RX ORDER — PROPOFOL 10 MG/ML
INJECTION, EMULSION INTRAVENOUS PRN
Status: DISCONTINUED | OUTPATIENT
Start: 2021-04-05 | End: 2021-04-05

## 2021-04-05 RX ORDER — DEXAMETHASONE SODIUM PHOSPHATE 4 MG/ML
INJECTION, SOLUTION INTRA-ARTICULAR; INTRALESIONAL; INTRAMUSCULAR; INTRAVENOUS; SOFT TISSUE PRN
Status: DISCONTINUED | OUTPATIENT
Start: 2021-04-05 | End: 2021-04-05

## 2021-04-05 RX ORDER — SODIUM CHLORIDE, SODIUM LACTATE, POTASSIUM CHLORIDE, CALCIUM CHLORIDE 600; 310; 30; 20 MG/100ML; MG/100ML; MG/100ML; MG/100ML
INJECTION, SOLUTION INTRAVENOUS CONTINUOUS
Status: DISCONTINUED | OUTPATIENT
Start: 2021-04-05 | End: 2021-04-05 | Stop reason: HOSPADM

## 2021-04-05 RX ORDER — NALOXONE HYDROCHLORIDE 0.4 MG/ML
0.2 INJECTION, SOLUTION INTRAMUSCULAR; INTRAVENOUS; SUBCUTANEOUS
Status: DISCONTINUED | OUTPATIENT
Start: 2021-04-05 | End: 2021-04-05 | Stop reason: HOSPADM

## 2021-04-05 RX ORDER — ACETAMINOPHEN 325 MG/1
650 TABLET ORAL
Status: DISCONTINUED | OUTPATIENT
Start: 2021-04-05 | End: 2021-04-05 | Stop reason: HOSPADM

## 2021-04-05 RX ORDER — ESMOLOL HYDROCHLORIDE 10 MG/ML
INJECTION INTRAVENOUS PRN
Status: DISCONTINUED | OUTPATIENT
Start: 2021-04-05 | End: 2021-04-05

## 2021-04-05 RX ORDER — EPHEDRINE SULFATE 50 MG/ML
INJECTION, SOLUTION INTRAMUSCULAR; INTRAVENOUS; SUBCUTANEOUS PRN
Status: DISCONTINUED | OUTPATIENT
Start: 2021-04-05 | End: 2021-04-05

## 2021-04-05 RX ORDER — NALOXONE HYDROCHLORIDE 0.4 MG/ML
0.4 INJECTION, SOLUTION INTRAMUSCULAR; INTRAVENOUS; SUBCUTANEOUS
Status: DISCONTINUED | OUTPATIENT
Start: 2021-04-05 | End: 2021-04-05 | Stop reason: HOSPADM

## 2021-04-05 RX ORDER — ACETAMINOPHEN 325 MG/1
650 TABLET ORAL EVERY 6 HOURS PRN
Qty: 24 TABLET | Refills: 0 | Status: SHIPPED | OUTPATIENT
Start: 2021-04-05 | End: 2021-04-08

## 2021-04-05 RX ORDER — PHENYLEPHRINE HCL IN 0.9% NACL 50MG/250ML
.5-1.25 PLASTIC BAG, INJECTION (ML) INTRAVENOUS CONTINUOUS
Status: DISCONTINUED | OUTPATIENT
Start: 2021-04-05 | End: 2021-04-05 | Stop reason: HOSPADM

## 2021-04-05 RX ORDER — ACETAMINOPHEN 500 MG
1000 TABLET ORAL EVERY 6 HOURS PRN
COMMUNITY

## 2021-04-05 RX ORDER — OXYCODONE HYDROCHLORIDE 10 MG/1
10 TABLET ORAL ONCE
Status: COMPLETED | OUTPATIENT
Start: 2021-04-05 | End: 2021-04-05

## 2021-04-05 RX ORDER — HYDROMORPHONE HYDROCHLORIDE 1 MG/ML
.3-.5 INJECTION, SOLUTION INTRAMUSCULAR; INTRAVENOUS; SUBCUTANEOUS EVERY 5 MIN PRN
Status: DISCONTINUED | OUTPATIENT
Start: 2021-04-05 | End: 2021-04-05 | Stop reason: HOSPADM

## 2021-04-05 RX ORDER — FENTANYL CITRATE 50 UG/ML
INJECTION, SOLUTION INTRAMUSCULAR; INTRAVENOUS PRN
Status: DISCONTINUED | OUTPATIENT
Start: 2021-04-05 | End: 2021-04-05

## 2021-04-05 RX ORDER — OXYCODONE HYDROCHLORIDE 10 MG/1
10 TABLET ORAL
Status: COMPLETED | OUTPATIENT
Start: 2021-04-05 | End: 2021-04-05

## 2021-04-05 RX ORDER — LIDOCAINE HYDROCHLORIDE 20 MG/ML
INJECTION, SOLUTION INFILTRATION; PERINEURAL PRN
Status: DISCONTINUED | OUTPATIENT
Start: 2021-04-05 | End: 2021-04-05

## 2021-04-05 RX ORDER — ONDANSETRON 4 MG/1
4 TABLET, ORALLY DISINTEGRATING ORAL
Status: DISCONTINUED | OUTPATIENT
Start: 2021-04-05 | End: 2021-04-05 | Stop reason: HOSPADM

## 2021-04-05 RX ORDER — OXYCODONE HYDROCHLORIDE 10 MG/1
10 TABLET ORAL
Status: DISCONTINUED | OUTPATIENT
Start: 2021-04-05 | End: 2021-04-05 | Stop reason: HOSPADM

## 2021-04-05 RX ORDER — LIDOCAINE 40 MG/G
CREAM TOPICAL
Status: DISCONTINUED | OUTPATIENT
Start: 2021-04-05 | End: 2021-04-05 | Stop reason: HOSPADM

## 2021-04-05 RX ORDER — CEFAZOLIN SODIUM 2 G/100ML
2 INJECTION, SOLUTION INTRAVENOUS SEE ADMIN INSTRUCTIONS
Status: DISCONTINUED | OUTPATIENT
Start: 2021-04-05 | End: 2021-04-05 | Stop reason: HOSPADM

## 2021-04-05 RX ORDER — CEFAZOLIN SODIUM 2 G/100ML
2 INJECTION, SOLUTION INTRAVENOUS
Status: COMPLETED | OUTPATIENT
Start: 2021-04-05 | End: 2021-04-05

## 2021-04-05 RX ADMIN — FENTANYL CITRATE 50 MCG: 50 INJECTION, SOLUTION INTRAMUSCULAR; INTRAVENOUS at 16:18

## 2021-04-05 RX ADMIN — SODIUM CHLORIDE, POTASSIUM CHLORIDE, SODIUM LACTATE AND CALCIUM CHLORIDE: 600; 310; 30; 20 INJECTION, SOLUTION INTRAVENOUS at 14:45

## 2021-04-05 RX ADMIN — DEXAMETHASONE SODIUM PHOSPHATE 6 MG: 4 INJECTION, SOLUTION INTRA-ARTICULAR; INTRALESIONAL; INTRAMUSCULAR; INTRAVENOUS; SOFT TISSUE at 15:10

## 2021-04-05 RX ADMIN — PHENYLEPHRINE HYDROCHLORIDE 100 MCG: 10 INJECTION INTRAVENOUS at 15:34

## 2021-04-05 RX ADMIN — SUGAMMADEX 200 MG: 100 INJECTION, SOLUTION INTRAVENOUS at 16:55

## 2021-04-05 RX ADMIN — MIDAZOLAM 2 MG: 1 INJECTION INTRAMUSCULAR; INTRAVENOUS at 14:45

## 2021-04-05 RX ADMIN — SODIUM CHLORIDE, POTASSIUM CHLORIDE, SODIUM LACTATE AND CALCIUM CHLORIDE: 600; 310; 30; 20 INJECTION, SOLUTION INTRAVENOUS at 15:00

## 2021-04-05 RX ADMIN — Medication 10 MG: at 15:41

## 2021-04-05 RX ADMIN — FENTANYL CITRATE 25 MCG: 50 INJECTION, SOLUTION INTRAMUSCULAR; INTRAVENOUS at 18:14

## 2021-04-05 RX ADMIN — ROCURONIUM BROMIDE 20 MG: 10 INJECTION INTRAVENOUS at 16:20

## 2021-04-05 RX ADMIN — CEFAZOLIN 2 G: 10 INJECTION, POWDER, FOR SOLUTION INTRAVENOUS at 15:05

## 2021-04-05 RX ADMIN — PROPOFOL 150 MG: 10 INJECTION, EMULSION INTRAVENOUS at 14:57

## 2021-04-05 RX ADMIN — FENTANYL CITRATE 25 MCG: 50 INJECTION, SOLUTION INTRAMUSCULAR; INTRAVENOUS at 18:23

## 2021-04-05 RX ADMIN — LIDOCAINE HYDROCHLORIDE 60 MG: 20 INJECTION, SOLUTION INFILTRATION; PERINEURAL at 14:57

## 2021-04-05 RX ADMIN — OXYCODONE HYDROCHLORIDE 10 MG: 10 TABLET ORAL at 11:18

## 2021-04-05 RX ADMIN — PHENYLEPHRINE HYDROCHLORIDE 0.4 MCG/KG/MIN: 10 INJECTION INTRAVENOUS at 15:49

## 2021-04-05 RX ADMIN — ESMOLOL HYDROCHLORIDE 30 MG: 10 INJECTION, SOLUTION INTRAVENOUS at 16:58

## 2021-04-05 RX ADMIN — FENTANYL CITRATE 100 MCG: 50 INJECTION, SOLUTION INTRAMUSCULAR; INTRAVENOUS at 15:34

## 2021-04-05 RX ADMIN — DEXTROSE AND SODIUM CHLORIDE: 5; 450 INJECTION, SOLUTION INTRAVENOUS at 13:08

## 2021-04-05 RX ADMIN — ROCURONIUM BROMIDE 20 MG: 10 INJECTION INTRAVENOUS at 15:35

## 2021-04-05 RX ADMIN — Medication 0.3 MCG/KG/MIN: at 16:26

## 2021-04-05 RX ADMIN — ROCURONIUM BROMIDE 60 MG: 10 INJECTION INTRAVENOUS at 14:57

## 2021-04-05 RX ADMIN — PHENYLEPHRINE HYDROCHLORIDE 50 MCG: 10 INJECTION INTRAVENOUS at 16:25

## 2021-04-05 RX ADMIN — ONDANSETRON 4 MG: 2 INJECTION INTRAMUSCULAR; INTRAVENOUS at 16:43

## 2021-04-05 RX ADMIN — HYDROMORPHONE HYDROCHLORIDE 0.3 MG: 1 INJECTION, SOLUTION INTRAMUSCULAR; INTRAVENOUS; SUBCUTANEOUS at 18:38

## 2021-04-05 RX ADMIN — PHENYLEPHRINE HYDROCHLORIDE 200 MCG: 10 INJECTION INTRAVENOUS at 15:22

## 2021-04-05 RX ADMIN — PHENYLEPHRINE HYDROCHLORIDE 100 MCG: 10 INJECTION INTRAVENOUS at 15:39

## 2021-04-05 RX ADMIN — PHENYLEPHRINE HYDROCHLORIDE 100 MCG: 10 INJECTION INTRAVENOUS at 16:42

## 2021-04-05 RX ADMIN — OXYCODONE HYDROCHLORIDE 10 MG: 10 TABLET ORAL at 19:15

## 2021-04-05 ASSESSMENT — MIFFLIN-ST. JEOR: SCORE: 1152.88

## 2021-04-05 ASSESSMENT — COPD QUESTIONNAIRES: COPD: 1

## 2021-04-05 NOTE — ANESTHESIA PROCEDURE NOTES
Arterial Line Procedure Note  Pre-Procedure   Staff -        Anesthesiologist:  Sabas Cotto DO       Performed By: anesthesiologist       Location: OR       Pre-Anesthestic Checklist: patient identified, IV checked, risks and benefits discussed, informed consent, monitors and equipment checked, pre-op evaluation and at physician/surgeon's request  Timeout:       Correct Patient: Yes        Correct Procedure: Yes        Correct Site: Yes        Correct Position: Yes   Procedure   Procedure: arterial line       Laterality: right       Insertion Site: radial.  Sterile Prep        Standard elements of sterile barrier followed       Skin prep: Chloraprep  Insertion/Injection        Technique: Seldinger Technique        Catheter Type/Size: 20 G, 1.75 in/4.5 cm quick cath (integral wire)  Narrative        Tegaderm dressing used.       Complications: None apparent,        Arterial waveform: Yes        IBP within 10% of NIBP: Yes

## 2021-04-05 NOTE — PROGRESS NOTES
CECILLE Irwinon was called because pt reports feeling hypoglycemic and BS is 77. CECILLE Irwinon VORB ordered to hang d5 1/2 NS @ 50cc/hr and to all back if pt is not feeling better soon. Rubi Clemons RN on 4/5/2021 at 12:52 PM     Pt reports feeling better. Rubi Clemons RN on 4/5/2021 at 1:09 PM

## 2021-04-05 NOTE — OR NURSING
Report given to Adriana HOLLINGSWORTH Phase 2.   Verbal order from Dr Gunderson Giving permission for patient to transfer to phase 2

## 2021-04-05 NOTE — ANESTHESIA PROCEDURE NOTES
Airway       Patient location during procedure: OR  Staff -        Anesthesiologist:  Sabas Cotto DO       CRNA: Sarah Vickers APRN CRNA       Performed By: CRNA  Consent for Airway        Urgency: elective  Indications and Patient Condition       Indications for airway management: oj-procedural       Induction type:intravenous       Mask difficulty assessment: 1 - vent by mask    Final Airway Details       Final airway type: endotracheal airway       Successful airway: ETT - single  Endotracheal Airway Details        ETT size (mm): 7.0       Cuffed: yes       Successful intubation technique: direct laryngoscopy       DL Blade Type: Mccormack 2       Grade View of Cords: 1       Adjucts: stylet       Position: Left       Measured from: lips       Secured at (cm): 22       Bite block used: None    Post intubation assessment        Placement verified by: capnometry, equal breath sounds and chest rise        Number of attempts at approach: 1       Number of other approaches attempted: 0       Secured with: pink tape       Ease of procedure: easy (vocal cords open and clear)       Dentition: Intact and Unchanged    Medication(s) Administered   Medication Administration Time: 4/5/2021 3:00 PM    Additional Comments       Small nick on upper lip (lips very dry)

## 2021-04-05 NOTE — ANESTHESIA POSTPROCEDURE EVALUATION
Patient: Anya Mazariegos    Procedure(s):  LYMPHADENECTOMY, MEDIASTINUM, EXTENDED, TRANSCERVICAL APPROACH    Diagnosis:Chest wall mass [R22.2]  Diagnosis Additional Information: No value filed.    Anesthesia Type:  General    Note:  Disposition: Admission   Postop Pain Control: Uneventful            Sign Out: Well controlled pain   PONV:    Neuro/Psych: Uneventful            Sign Out: Acceptable/Baseline neuro status   Airway/Respiratory: Uneventful            Sign Out: Acceptable/Baseline resp. status   CV/Hemodynamics: Uneventful            Sign Out: Acceptable CV status   Other NRE:    DID A NON-ROUTINE EVENT OCCUR?          Last vitals:  Vitals:    04/05/21 1745 04/05/21 1800 04/05/21 1815   BP: 122/63 129/69 120/64   Pulse: 84 86 88   Resp:      Temp:      SpO2:  95% 95%       Last vitals prior to Anesthesia Care Transfer:  CRNA VITALS  4/5/2021 1631 - 4/5/2021 1731      4/5/2021             Pulse:  82    SpO2:  99 %    EKG:  Sinus rhythm          Electronically Signed By: Hugh Gunderson MD  April 5, 2021  6:59 PM

## 2021-04-05 NOTE — ANESTHESIA CARE TRANSFER NOTE
Patient: Anya Mazariegos    Procedure(s):  LYMPHADENECTOMY, MEDIASTINUM, EXTENDED, TRANSCERVICAL APPROACH    Diagnosis: Chest wall mass [R22.2]  Diagnosis Additional Information: No value filed.    Anesthesia Type:   General     Note:    Oropharynx: oropharynx clear of all foreign objects and spontaneously breathing  Level of Consciousness: awake  Oxygen Supplementation: nasal cannula  Level of Supplemental Oxygen (L/min / FiO2): 2  Independent Airway: airway patency satisfactory and stable  Dentition: dentition unchanged  Vital Signs Stable: post-procedure vital signs reviewed and stable  Report to RN Given: handoff report given  Patient transferred to: PACU    Handoff Report: Identifed the Patient, Identified the Reponsible Provider, Reviewed the pertinent medical history, Discussed the surgical course, Reviewed Intra-OP anesthesia mangement and issues during anesthesia, Set expectations for post-procedure period and Allowed opportunity for questions and acknowledgement of understanding      Vitals: (Last set prior to Anesthesia Care Transfer)  CRNA VITALS  4/5/2021 1631 - 4/5/2021 1706      4/5/2021             Pulse:  82    SpO2:  99 %    EKG:  Sinus rhythm        Electronically Signed By: DARA Bustos CRNA  April 5, 2021  5:06 PM

## 2021-04-05 NOTE — BRIEF OP NOTE
[facility]    Brief Operative Note    Pre-operative diagnosis: Chest wall mass [R22.2]  Post-operative diagnosis Same as pre-operative diagnosis    Procedure: Procedure(s):  LYMPHADENECTOMY, MEDIASTINUM, EXTENDED, TRANSCERVICAL APPROACH  Surgeon: Surgeon(s) and Role:     * Samira Grady MD - Primary      * Nir Sauceda MD (Fellow)  Anesthesia: General   Estimated blood loss: Minimal  Drains: None  Specimens:   ID Type Source Tests Collected by Time Destination   A : 4R Tissue Lymph Node SURGICAL PATHOLOGY EXAM Samira Grady MD 4/5/2021  4:08 PM    B : 4L Tissue Lymph Node SURGICAL PATHOLOGY EXAM Samira Grady MD 4/5/2021  4:16 PM    C : STATION 7 Tissue Lymph Node SURGICAL PATHOLOGY EXAM Samira Grady MD 4/5/2021  4:34 PM    D : STATION 7 Tissue Lymph Node SURGICAL PATHOLOGY EXAM Samira Grady MD 4/5/2021  4:39 PM      Findings:   enlarged 4R lymph node, frozen section negative for malignancy in 4R/4L & 7.  Complications: None.  Implants: * No implants in log *

## 2021-04-05 NOTE — DISCHARGE INSTRUCTIONS
[facility], [place]  Same-Day Surgery   Adult Discharge Orders & Instructions     For 24 hours after surgery    1. Get plenty of rest.  A responsible adult must stay with you for at least 24 hours after you leave the hospital.   2. Do not drive or use heavy equipment.  If you have weakness or tingling, don't drive or use heavy equipment until this feeling goes away.  3. Do not drink alcohol.  4. Avoid strenuous or risky activities.  Ask for help when climbing stairs.   5. You may feel lightheaded.  IF so, sit for a few minutes before standing.  Have someone help you get up.   6. If you have nausea (feel sick to your stomach): Drink only clear liquids such as apple juice, ginger ale, broth or 7-Up.  Rest may also help.  Be sure to drink enough fluids.  Move to a regular diet as you feel able.  7. You may have a slight fever. Call the doctor if your fever is over 100 F (37.7 C) (taken under the tongue) or lasts longer than 24 hours.  8. You may have a dry mouth, a sore throat, muscle aches or trouble sleeping.  These should go away after 24 hours.  9. Do not make important or legal decisions.   Call your doctor for any of the followin.  Signs of infection (fever, growing tenderness at the surgery site, a large amount of drainage or bleeding, severe pain, foul-smelling drainage, redness, swelling).    2. It has been over 8 to 10 hours since surgery and you are still not able to urinate (pass water).    3.  Headache for over 24 hours.    To contact a doctor, Alexis Hogan -[phone] and ask for the resident on call for Thoracic Surgery  (answered 24 hours a day)  Emergency Department on [facility]: [phone]                                                                  (TTY for hearing impaired: [phone])

## 2021-04-06 NOTE — OP NOTE
Procedure Date: 04/05/2021      PREOPERATIVE DIAGNOSES:  Pancoast tumor and mediastinal adenopathy.      POSTOPERATIVE DIAGNOSIS:  Pancoast tumor and mediastinal adenopathy.      PROCEDURES PERFORMED:  Transcervical extended mediastinal lymphadenectomy.      SURGEON:  Samira Smith MD      ASSISTANT:  Len Sauceda MD, Cardiothoracic Surgery Fellow.      COMPLICATIONS:  None.      ESTIMATED BLOOD LOSS:  Minimal.      SPECIMENS:  Mediastinal lymph node stations 4R, 7 and 4L.      DESCRIPTION OF PROCEDURE IN DETAIL:  The patient was taken to the operating room and laid supine.  General anesthesia was induced.  The patient's neck and chest were prepped with ChloraPrep and draped in normal sterile fashion.  A formal timeout was carried out, confirming the name of the patient and correct procedure.  She had SCDs in place and functioning prior to induction of anesthesia.  She received antibiotics within 30 minutes of incision.      After the timeout was complete, we started by injecting local anesthetic.  We then made a 3 cm transverse incision at the base of the neck 1 fingerbreadth above the suprasternal notch.  We divided the platysma and the strap muscles.  We then used the surgeon's finger to develop the pretracheal plane.  The overhead frame for the tendon retractor was then assembled.  The retrosternal hook was placed, and the patient was then connected to the overhead retractor.  We then introduced the Dunham mediastinoscope.  We examined the patient's mediastinal lymph nodes.  Station 4R appeared to be enlarged.  This lymph node was then excised and sent to Pathology for frozen and was negative for malignancy.  We then proceeded to 4L.  The patient had a small lymph node in this location, which was excised and sent to Pathology.  We minimized cautery used on station 4L.      We then resected station 7, which did not appear to be enlarged.  Hemostasis was confirmed.  The patient tolerated the procedure  well.  After hemostasis was confirmed, we then closed the incision in multiple layers using absorbable suture.  Exofin was applied to the skin incision.  She was extubated and transferred to PACU for recovery.  All instrument and sponge counts were correct at the end of the case.         MADELIN BOWER MD             D: 2021   T: 2021   MT: JOAQUIM      Name:     HEIDI BENITEZ   MRN:      [id number]        Account:        [id number]   :      1960           Procedure Date: 2021      Document: [id number]

## 2021-04-07 LAB — COPATH REPORT: NORMAL

## 2021-04-09 LAB
BLD PROD TYP BPU: NORMAL
BLD PROD TYP BPU: NORMAL
BLD UNIT ID BPU: 0
BLD UNIT ID BPU: 0
BLOOD PRODUCT CODE: NORMAL
BLOOD PRODUCT CODE: NORMAL
BPU ID: NORMAL
BPU ID: NORMAL
TRANSFUSION STATUS PATIENT QL: NORMAL

## 2021-04-12 LAB
DLCOCOR-%PRED-PRE: 98 %
DLCOCOR-PRE: 20.84 ML/MIN/MMHG
DLCOUNC-%PRED-PRE: 88 %
DLCOUNC-PRE: 18.63 ML/MIN/MMHG
DLCOUNC-PRED: 21.08 ML/MIN/MMHG
ERV-%PRED-PRE: 17 %
ERV-PRE: 0.18 L
ERV-PRED: 1.01 L
EXPTIME-PRE: 8.42 SEC
FEF2575-%PRED-POST: 59 %
FEF2575-%PRED-PRE: 59 %
FEF2575-POST: 1.44 L/SEC
FEF2575-PRE: 1.43 L/SEC
FEF2575-PRED: 2.41 L/SEC
FEFMAX-%PRED-PRE: 75 %
FEFMAX-PRE: 4.95 L/SEC
FEFMAX-PRED: 6.55 L/SEC
FEV1-%PRED-PRE: 71 %
FEV1-PRE: 1.9 L
FEV1FEV6-PRE: 75 %
FEV1FEV6-PRED: 81 %
FEV1FVC-PRE: 75 %
FEV1FVC-PRED: 79 %
FEV1SVC-PRE: 74 %
FEV1SVC-PRED: 77 %
FIFMAX-PRE: 3.29 L/SEC
FRCPLETH-%PRED-PRE: 109 %
FRCPLETH-PRE: 3.02 L
FRCPLETH-PRED: 2.76 L
FVC-%PRED-PRE: 75 %
FVC-PRE: 2.54 L
FVC-PRED: 3.34 L
IC-%PRED-PRE: 97 %
IC-PRE: 2.37 L
IC-PRED: 2.43 L
RVPLETH-%PRED-PRE: 148 %
RVPLETH-PRE: 2.84 L
RVPLETH-PRED: 1.92 L
TLCPLETH-%PRED-PRE: 105 %
TLCPLETH-PRE: 5.39 L
TLCPLETH-PRED: 5.11 L
VA-%PRED-PRE: 79 %
VA-PRE: 3.99 L
VC-%PRED-PRE: 74 %
VC-PRE: 2.55 L
VC-PRED: 3.44 L

## 2021-04-13 ENCOUNTER — VIRTUAL VISIT (OUTPATIENT)
Dept: SURGERY | Facility: CLINIC | Age: 61
End: 2021-04-13
Attending: STUDENT IN AN ORGANIZED HEALTH CARE EDUCATION/TRAINING PROGRAM
Payer: COMMERCIAL

## 2021-04-13 DIAGNOSIS — R91.8 LUNG MASS: Primary | ICD-10-CM

## 2021-04-13 PROCEDURE — 999N001193 HC VIDEO/TELEPHONE VISIT; NO CHARGE

## 2021-04-13 PROCEDURE — 99024 POSTOP FOLLOW-UP VISIT: CPT | Mod: 95 | Performed by: STUDENT IN AN ORGANIZED HEALTH CARE EDUCATION/TRAINING PROGRAM

## 2021-04-13 NOTE — PROGRESS NOTES
"Anya is a 61 year old who is being evaluated via a billable video visit.      How would you like to obtain your AVS? MyChart  If the video visit is dropped, the invitation should be resent by: Text to cell phone: [phone]  Will anyone else be joining your video visit? No       I have reviewed and updated the patient's allergies and medication list.    Concerns: none  Refills: none     Vitals - Patient Reported  Weight (Patient Reported): 61.2 kg (135 lb)  Height (Patient Reported): 165.1 cm (5' 5\")  BMI (Based on Pt Reported Ht/Wt): 22.46  Pain Score: Extreme Pain (8)  Pain Loc: Other - see comment      Viktoria Thayer CMA        Video Start Time: 6:23 PM  Video-Visit Details    Type of service:  Video Visit    Video End Time:6:23 PM    Originating Location (pt. Location): Home    Distant Location (provider location):  [facility] CANC[facility]     Platform used for Video Visit: [facility]     THORACIC SURGERY FOLLOW UP VISIT      I saw Ms. Mazariegos in follow-up today. The clinical summary follows:     PREOP DIAGNOSIS   RUL lung cancer      PROCEDURE   TEMLA    DATE OF PROCEDURE  4/5/21    HISTOPATHOLOGY   4R, 4L , 7 - negative for malignancy    COMPLICATIONS  None      SUBJECTIVE   Recovering well after surgery but feeling very anxious about not strting treatment yet.        From a personal perspective, she is alone today     IMPRESSION   61 year-old F with  [id number] adenoca of RUL    PLAN  I spent 45 min on the date of the encounter in chart review, patient visit, review of tests, documentation and/or discussion with other providers about the issues documented above. I reviewed the plan as follows:  We discussed her pathology at length.  SHe will need to be seen by oncology and rad onc to start her neoadjuvant treatment following which we will plan for potential surgery. I will reach out to them but advised her to do so as well.  We also discussed smoking cessation again.     All questions were " answered and the patient and present family were in agreement with the plan.  I appreciate the opportunity to participate in the care of your patient and will keep you updated.  Sincerely,

## 2021-04-13 NOTE — LETTER
"    4/13/2021         RE: Anya Mazariegos  [address]  [facility] [address]        Dear Colleague,    Thank you for referring your patient, Anya Mazariegos, to the [facility]. Please see a copy of my visit note below.    Anya is a 61 year old who is being evaluated via a billable video visit.      How would you like to obtain your AVS? MyChart  If the video visit is dropped, the invitation should be resent by: Text to cell phone: [phone]  Will anyone else be joining your video visit? No       I have reviewed and updated the patient's allergies and medication list.    Concerns: none  Refills: none     Vitals - Patient Reported  Weight (Patient Reported): 61.2 kg (135 lb)  Height (Patient Reported): 165.1 cm (5' 5\")  BMI (Based on Pt Reported Ht/Wt): 22.46  Pain Score: Extreme Pain (8)  Pain Loc: Other - see comment      Viktoria Thayer CMA        Video Start Time: 6:23 PM  Video-Visit Details    Type of service:  Video Visit    Video End Time:6:23 PM    Originating Location (pt. Location): Home    Distant Location (provider location):  [facility]     Platform used for Video Visit: [facility]     THORACIC SURGERY FOLLOW UP VISIT      I saw Ms. Mazariegos in follow-up today. The clinical summary follows:     PREOP DIAGNOSIS   RUL lung cancer      PROCEDURE   TEMLA    DATE OF PROCEDURE  4/5/21    HISTOPATHOLOGY   4R, 4L , 7 - negative for malignancy    COMPLICATIONS  None      SUBJECTIVE   Recovering well after surgery but feeling very anxious about not strting treatment yet.        From a personal perspective, she is alone today     IMPRESSION   61 year-old F with  [id number] adenoca of RUL    PLAN  I spent 45 min on the date of the encounter in chart review, patient visit, review of tests, documentation and/or discussion with other providers about the issues documented above. I reviewed the plan as follows:  We discussed her pathology at length.  SHe will need to be " seen by oncology and rad onc to start her neoadjuvant treatment following which we will plan for potential surgery. I will reach out to them but advised her to do so as well.  We also discussed smoking cessation again.     All questions were answered and the patient and present family were in agreement with the plan.  I appreciate the opportunity to participate in the care of your patient and will keep you updated.  Sincerely,            Again, thank you for allowing me to participate in the care of your patient.        Sincerely,        Daly Doll MD

## 2021-04-14 DIAGNOSIS — C34.91 PRIMARY ADENOCARCINOMA OF RIGHT LUNG (H): Primary | ICD-10-CM

## 2021-04-19 ENCOUNTER — VIRTUAL VISIT (OUTPATIENT)
Dept: ONCOLOGY | Facility: CLINIC | Age: 61
End: 2021-04-19
Attending: INTERNAL MEDICINE
Payer: COMMERCIAL

## 2021-04-19 ENCOUNTER — TELEPHONE (OUTPATIENT)
Dept: SURGERY | Facility: CLINIC | Age: 61
End: 2021-04-19

## 2021-04-19 DIAGNOSIS — C34.11 MALIGNANT NEOPLASM OF UPPER LOBE OF RIGHT LUNG (H): Primary | ICD-10-CM

## 2021-04-19 PROCEDURE — 999N001193 HC VIDEO/TELEPHONE VISIT; NO CHARGE

## 2021-04-19 PROCEDURE — 99215 OFFICE O/P EST HI 40 MIN: CPT | Mod: 95 | Performed by: INTERNAL MEDICINE

## 2021-04-19 NOTE — TELEPHONE ENCOUNTER
Called patient to see where she would like her radiation oncology referral sent to. Left a voicemail with my call back information.

## 2021-04-19 NOTE — PROGRESS NOTES
"Anya is a 61 year old who is being evaluated via a billable video visit.      How would you like to obtain your AVS? MyChart  If the video visit is dropped, the invitation should be resent by: Text to cell phone: [phone]  Will anyone else be joining your video visit? No     Vitals - Patient Reported  Weight (Patient Reported): 61.2 kg (135 lb)  Height (Patient Reported): 165.1 cm (5' 5\")  BMI (Based on Pt Reported Ht/Wt): 22.46  Pain Score: Extreme Pain (8)  Pain Loc: Other - see comment(FRONT AND BACK OF RIGHT SIDE OF CHEST)    Charo CARBONE        Video Start Time: 3:10 PM  Video-Visit Details    Type of service:  Video Visit    Video End Time:3:45    Originating Location (pt. Location): Home    Distant Location (provider location):  [facility]     Platform used for Video Visit: [employer]  CC:  Adenocarcinoma of the lung (cT3/4 N2 M0)  Stage 3     History of Present Illness:  Patient presented with right shoulder blade pain for approximately 12 months. She was referred to physical therapy but did not have improvement of her pain. No history of injury. Range of motion of her shoulder is within normal limits without pain. MRI of her scapula was completed which was normal. Thoracic MRI completed which shows.: Approximately 5.5 cm right apical lung mass with localized soft tissue swelling and osseous invasion/erosion consistent with primary lung malignancy. Edema of right side of T2 vertebrae, which may be reactive without obvious infiltration. Spinal cord appears normal.)  CT chest abdomen pelvis was done which showed right apical lung mass approximately 6 cm with local bone and soft tissue invasion or reactive changes. Consistent with primary lung neoplasm. No evidence of metastatic disease. 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     Other screening history: Negative Cologuard 8/10/2020. Mammogram - 6/9/2020. Patient reports negative Pap approximately 6 years ago.     She has no known pulmonary problems however lifelong smoker, 1 pack/day for 40+ years, no history of exposure. She has no respiratory symptoms except for occasional cough with no phlegm production. Her mother had COPD and she was a smoker as well.  She has diabetes which is insulin-dependent.     On March 17, 2021 she underwent flexible bronchoscopy by Dr. Cote.  Bx revealed adenocarcinoma with origin unclear but clinical picture consistent with NSCLC.      The patient underwent an MRI brain scan which showed no intracranial cancer.  PET scan showed no extrathoracic disease.    The patient underwent a TEMLA which showed no cancer in 4R, 4L, and station 7 nodes.  I discussed the results with Dr. Doll.      INTERVAL HISTORY:   Anya was connected with video for follow up. She reports she still has right shoulder/back area, the pain is about 7-8/10 and it is persistent. She is taking MS contin and oxy ir as needed for pain control, but has not be able to get complete pain control yet. She denies any cough or sputum, no dyspnea, not on oxygen. She has some running nose intermittent. She got both dose of COVID vaccine in January. She is otherwise doing okay, denies any other new symptoms.       ROS:  12 systems were reviewed, and all were negative unless mentioned above.       LAB:  As noted above, I have reviewed the pathology reports and discussed them with Dr. Doll.    SPECIMEN(S):   A: Lymph node, 4R   B: Lymph node, 4L   C: Lymph node, station 7   D: Lymph node, station 7     FINAL DIAGNOSIS:   A. Lymph node, 4R:   - No malignancy identified in two lymph nodes (0/2)     B. Lymph node, 4L:   - No malignancy identified in one lymph node (0/1)     C. Lymph node, station 7:   - No malignancy identified in multiple lymph node fragments     D. Lymph node,  station 7:   - No malignancy identified in one lymph node (0/1)     CBC RESULTS:       Lab Test 04/01/21  1250   WBC 12.1*   RBC 4.21   HGB 10.4*   HCT 34.2*   MCV 81   MCH 24.7*   MCHC 30.4*   RDW 14.5   *     Last Comprehensive Metabolic Panel:  Sodium   Date Value Ref Range Status   04/01/2021 134 133 - 144 mmol/L Final     Potassium   Date Value Ref Range Status   04/01/2021 3.8 3.4 - 5.3 mmol/L Final     Chloride   Date Value Ref Range Status   04/01/2021 98 94 - 109 mmol/L Final     Carbon Dioxide   Date Value Ref Range Status   04/01/2021 31 20 - 32 mmol/L Final     Anion Gap   Date Value Ref Range Status   04/01/2021 4 3 - 14 mmol/L Final     Glucose   Date Value Ref Range Status   04/01/2021 110 (H) 70 - 99 mg/dL Final     Urea Nitrogen   Date Value Ref Range Status   04/01/2021 6 (L) 7 - 30 mg/dL Final     Creatinine   Date Value Ref Range Status   04/01/2021 0.55 0.52 - 1.04 mg/dL Final     GFR Estimate   Date Value Ref Range Status   04/01/2021 >90 >60 mL/min/[1.73_m2] Final     Comment:     Non  GFR Calc  Starting 12/18/2018, serum creatinine based estimated GFR (eGFR) will be   calculated using the Chronic Kidney Disease Epidemiology Collaboration   (CKD-EPI) equation.       Calcium   Date Value Ref Range Status   04/01/2021 8.8 8.5 - 10.1 mg/dL Final     MED:    Current Outpatient Medications   Medication     acetaminophen (TYLENOL) 500 MG tablet     atorvastatin (LIPITOR) 40 MG tablet     ibuprofen (ADVIL/MOTRIN) 200 MG tablet     insulin glargine (LANTUS SOLOSTAR) 100 UNIT/ML pen     metFORMIN (GLUCOPHAGE-XR) 500 MG 24 hr tablet     morphine (MS CONTIN) 60 MG 12 hr tablet     omeprazole (PRILOSEC) 40 MG DR capsule     oxyCODONE (OXY-IR) 5 MG capsule     sennosides (SENOKOT) 8.6 MG tablet     No current facility-administered medications for this visit.      PE:  No vitals were taken during this visit.   HEENT:  Without conjunctivitis today.  No adenopathy  Respiratory: No  respiratory distress  Ext: without rash or edema  Skin; no rashes in visible area during video visit.   Neuro:  No focal weakness.      A/P:  1) Adenocarcinoma of the lung:  Her MRI brain was negative for metastatic disease, and PET was negative for any distant metastases. TEMLA showed all the lymph node biopsied was negative. Thus the patient has clinical stage 3 (T4N0M)) NSCLC. Her genomic testing of the primary tumor.  It is 70% PD-L1 positive. We will plan for concurrent chemo/XRT followed by durvalumab, which could be done at the [facility]. Chemotherapy side effects were reviewed with her.      2) Right scapular pain:  This pain is almost certainly due to the tumor. It has been poorly controlled with oral narcotics and is clearly limiting her quality of life.  The patient agrees a referral to Palliative Services for assistance in pain management sounds like a good idea. Continue to follow with palliative medicine for pain control       Plan   - to start with concurrent chemoradiation with carbo + Pemtrexed, stage III, treatment with curative intent  - Patient prefer to receive treatment close to home. Dr Ramirez will help to arrange care at [facility].         Patient was discussed with Dr Ramirez.     Yajaira Mendez   Hem/Onco fellow     I have independently seen and examined this patient and my assessment is in agreement with the above note.  I have reviewed all tests and past medical history and my evaluation agrees with the findings in the note.  The patient has stage 3 unresectable NSCLC.  I recommend concurrent chemoradiation followed by a year of durvalumab.  My preference is carbo/cis platinum and pemetrexed as per the Proclaim trial (J Clin Oncol  . 2016 Mar 20;34(9):953-62.) for the concurrent chemo during radiation.  We will reach out to Dr. Vu at [facility] to help coordinate this therapy.  No follow up needed with our clinic at this time.

## 2021-04-19 NOTE — LETTER
"    4/19/2021         RE: Anya Mazariegos  [address]  [place] [address]        Dear Colleague,    Thank you for referring your patient, Anya Mazariegos, to the [facility]. Please see a copy of my visit note below.    Anya is a 61 year old who is being evaluated via a billable video visit.      How would you like to obtain your AVS? MyChart  If the video visit is dropped, the invitation should be resent by: Text to cell phone: [phone]  Will anyone else be joining your video visit? No     Vitals - Patient Reported  Weight (Patient Reported): 61.2 kg (135 lb)  Height (Patient Reported): 165.1 cm (5' 5\")  BMI (Based on Pt Reported Ht/Wt): 22.46  Pain Score: Extreme Pain (8)  Pain Loc: Other - see comment(FRONT AND BACK OF RIGHT SIDE OF CHEST)    Charo CARBONE        Video Start Time: 3:10 PM  Video-Visit Details    Type of service:  Video Visit    Video End Time:3:45    Originating Location (pt. Location): Home    Distant Location (provider location):  [facility]     Platform used for Video Visit: [employer]  CC:  Adenocarcinoma of the lung (cT3/4 N2 M0)  Stage 3     History of Present Illness:  Patient presented with right shoulder blade pain for approximately 12 months. She was referred to physical therapy but did not have improvement of her pain. No history of injury. Range of motion of her shoulder is within normal limits without pain. MRI of her scapula was completed which was normal. Thoracic MRI completed which shows.: Approximately 5.5 cm right apical lung mass with localized soft tissue swelling and osseous invasion/erosion consistent with primary lung malignancy. Edema of right side of T2 vertebrae, which may be reactive without obvious infiltration. Spinal cord appears normal.)  CT chest abdomen pelvis was done which showed right apical lung mass approximately 6 cm with local bone and soft tissue invasion or reactive changes. Consistent with " primary lung neoplasm. No evidence of metastatic disease. 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     Other screening history: Negative Cologuard 8/10/2020. Mammogram - 6/9/2020. Patient reports negative Pap approximately 6 years ago.     She has no known pulmonary problems however lifelong smoker, 1 pack/day for 40+ years, no history of exposure. She has no respiratory symptoms except for occasional cough with no phlegm production. Her mother had COPD and she was a smoker as well.  She has diabetes which is insulin-dependent.     On March 17, 2021 she underwent flexible bronchoscopy by Dr. Cote.  Bx revealed adenocarcinoma with origin unclear but clinical picture consistent with NSCLC.      The patient underwent an MRI brain scan which showed no intracranial cancer.  PET scan showed no extrathoracic disease.    The patient underwent a TEMLA which showed no cancer in 4R, 4L, and station 7 nodes.  I discussed the results with Dr. Doll.      INTERVAL HISTORY:   Anya was connected with video for follow up. She reports she still has right shoulder/back area, the pain is about 7-8/10 and it is persistent. She is taking MS contin and oxy ir as needed for pain control, but has not be able to get complete pain control yet. She denies any cough or sputum, no dyspnea, not on oxygen. She has some running nose intermittent. She got both dose of COVID vaccine in January. She is otherwise doing okay, denies any other new symptoms.       ROS:  12 systems were reviewed, and all were negative unless mentioned above.       LAB:  As noted above, I have reviewed the pathology reports and discussed them with Dr. Doll.    SPECIMEN(S):   A: Lymph node, 4R   B: Lymph node, 4L   C: Lymph node, station 7   D: Lymph node, station 7     FINAL DIAGNOSIS:   A. Lymph  node, 4R:   - No malignancy identified in two lymph nodes (0/2)     B. Lymph node, 4L:   - No malignancy identified in one lymph node (0/1)     C. Lymph node, station 7:   - No malignancy identified in multiple lymph node fragments     D. Lymph node, station 7:   - No malignancy identified in one lymph node (0/1)     CBC RESULTS:       Lab Test 04/01/21  1250   WBC 12.1*   RBC 4.21   HGB 10.4*   HCT 34.2*   MCV 81   MCH 24.7*   MCHC 30.4*   RDW 14.5   *     Last Comprehensive Metabolic Panel:  Sodium   Date Value Ref Range Status   04/01/2021 134 133 - 144 mmol/L Final     Potassium   Date Value Ref Range Status   04/01/2021 3.8 3.4 - 5.3 mmol/L Final     Chloride   Date Value Ref Range Status   04/01/2021 98 94 - 109 mmol/L Final     Carbon Dioxide   Date Value Ref Range Status   04/01/2021 31 20 - 32 mmol/L Final     Anion Gap   Date Value Ref Range Status   04/01/2021 4 3 - 14 mmol/L Final     Glucose   Date Value Ref Range Status   04/01/2021 110 (H) 70 - 99 mg/dL Final     Urea Nitrogen   Date Value Ref Range Status   04/01/2021 6 (L) 7 - 30 mg/dL Final     Creatinine   Date Value Ref Range Status   04/01/2021 0.55 0.52 - 1.04 mg/dL Final     GFR Estimate   Date Value Ref Range Status   04/01/2021 >90 >60 mL/min/[1.73_m2] Final     Comment:     Non  GFR Calc  Starting 12/18/2018, serum creatinine based estimated GFR (eGFR) will be   calculated using the Chronic Kidney Disease Epidemiology Collaboration   (CKD-EPI) equation.       Calcium   Date Value Ref Range Status   04/01/2021 8.8 8.5 - 10.1 mg/dL Final     MED:    Current Outpatient Medications   Medication     acetaminophen (TYLENOL) 500 MG tablet     atorvastatin (LIPITOR) 40 MG tablet     ibuprofen (ADVIL/MOTRIN) 200 MG tablet     insulin glargine (LANTUS SOLOSTAR) 100 UNIT/ML pen     metFORMIN (GLUCOPHAGE-XR) 500 MG 24 hr tablet     morphine (MS CONTIN) 60 MG 12 hr tablet     omeprazole (PRILOSEC) 40 MG DR capsule     oxyCODONE  (OXY-IR) 5 MG capsule     sennosides (SENOKOT) 8.6 MG tablet     No current facility-administered medications for this visit.      PE:  No vitals were taken during this visit.   HEENT:  Without conjunctivitis today.  No adenopathy  Respiratory: No respiratory distress  Ext: without rash or edema  Skin; no rashes in visible area during video visit.   Neuro:  No focal weakness.      A/P:  1) Adenocarcinoma of the lung:  Her MRI brain was negative for metastatic disease, and PET was negative for any distant metastases. TEMLA showed all the lymph node biopsied was negative. Thus the patient has clinical stage 3 (T4N0M)) NSCLC. Her genomic testing of the primary tumor.  It is 70% PD-L1 positive. We will plan for concurrent chemo/XRT followed by durvalumab, which could be done at the [facility]. Chemotherapy side effects were reviewed with her.      2) Right scapular pain:  This pain is almost certainly due to the tumor. It has been poorly controlled with oral narcotics and is clearly limiting her quality of life.  The patient agrees a referral to Palliative Services for assistance in pain management sounds like a good idea. Continue to follow with palliative medicine for pain control       Plan   - to start with concurrent chemoradiation with carbo + Pemtrexed, stage III, treatment with curative intent  - Patient prefer to receive treatment close to home. Dr Ramirez will help to arrange care at [facility].         Patient was discussed with Dr Ramirez.     Yajaira Mendez   Hem/Onco fellow     I have independently seen and examined this patient and my assessment is in agreement with the above note.  I have reviewed all tests and past medical history and my evaluation agrees with the findings in the note.  The patient has stage 3 unresectable NSCLC.  I recommend concurrent chemoradiation followed by a year of durvalumab.  My preference is carbo/cis platinum and pemetrexed as per  the Proclaim trial (J Clin Oncol  . 2016 Mar 20;34(9):953-62.) for the concurrent chemo during radiation.  We will reach out to Dr. Vu at [facility] to help coordinate this therapy.  No follow up needed with our clinic at this time.            Again, thank you for allowing me to participate in the care of your patient.        Sincerely,        Simon Ramirez MD

## 2021-04-21 ENCOUNTER — DOCUMENTATION ONLY (OUTPATIENT)
Dept: ONCOLOGY | Facility: CLINIC | Age: 61
End: 2021-04-21

## 2021-04-21 NOTE — PROGRESS NOTES
"Message received from CC:    \"Can you please send her a REYNOLD to her address on file and place a note to sign it and mail back to us. \"    REYNOLD mailed.    More Lara CMA    "

## 2021-05-01 ENCOUNTER — TRANSFERRED RECORDS (OUTPATIENT)
Dept: HEALTH INFORMATION MANAGEMENT | Facility: CLINIC | Age: 61
End: 2021-05-01

## 2021-05-12 ENCOUNTER — TRANSFERRED RECORDS (OUTPATIENT)
Dept: HEALTH INFORMATION MANAGEMENT | Facility: CLINIC | Age: 61
End: 2021-05-12

## 2021-05-12 LAB
ALT SERPL-CCNC: 9 U/L (ref 0–35)
AST SERPL-CCNC: 14 U/L (ref 14–36)
CREAT SERPL-MCNC: 0.4 MG/DL (ref 0.7–1.2)
GFR SERPL CREATININE-BSD FRML MDRD: 163 ML/MIN (ref 60–130)
GLUCOSE SERPL-MCNC: 172 MG/DL (ref 70–100)
POTASSIUM SERPL-SCNC: 4.2 MMOL/L (ref 3.5–5)

## 2021-05-19 ENCOUNTER — TRANSFERRED RECORDS (OUTPATIENT)
Dept: HEALTH INFORMATION MANAGEMENT | Facility: CLINIC | Age: 61
End: 2021-05-19

## 2021-05-20 ENCOUNTER — TRANSFERRED RECORDS (OUTPATIENT)
Dept: HEALTH INFORMATION MANAGEMENT | Facility: CLINIC | Age: 61
End: 2021-05-20

## 2021-05-24 ENCOUNTER — TRANSFERRED RECORDS (OUTPATIENT)
Dept: HEALTH INFORMATION MANAGEMENT | Facility: CLINIC | Age: 61
End: 2021-05-24

## 2021-05-27 ENCOUNTER — TRANSFERRED RECORDS (OUTPATIENT)
Dept: HEALTH INFORMATION MANAGEMENT | Facility: CLINIC | Age: 61
End: 2021-05-27

## 2021-07-04 ENCOUNTER — HEALTH MAINTENANCE LETTER (OUTPATIENT)
Age: 61
End: 2021-07-04

## 2021-10-24 ENCOUNTER — HEALTH MAINTENANCE LETTER (OUTPATIENT)
Age: 61
End: 2021-10-24

## 2022-02-13 ENCOUNTER — HEALTH MAINTENANCE LETTER (OUTPATIENT)
Age: 62
End: 2022-02-13

## 2022-04-10 ENCOUNTER — HEALTH MAINTENANCE LETTER (OUTPATIENT)
Age: 62
End: 2022-04-10

## 2022-06-05 ENCOUNTER — HEALTH MAINTENANCE LETTER (OUTPATIENT)
Age: 62
End: 2022-06-05

## 2022-10-16 ENCOUNTER — HEALTH MAINTENANCE LETTER (OUTPATIENT)
Age: 62
End: 2022-10-16

## 2023-03-26 ENCOUNTER — HEALTH MAINTENANCE LETTER (OUTPATIENT)
Age: 63
End: 2023-03-26

## 2023-06-01 ENCOUNTER — HEALTH MAINTENANCE LETTER (OUTPATIENT)
Age: 63
End: 2023-06-01

## 2023-08-26 ENCOUNTER — HEALTH MAINTENANCE LETTER (OUTPATIENT)
Age: 63
End: 2023-08-26

## 2024-01-13 ENCOUNTER — HEALTH MAINTENANCE LETTER (OUTPATIENT)
Age: 64
End: 2024-01-13

## (undated) DEVICE — PACK POST OP HEART

## (undated) DEVICE — ENDO VALVE BX EVIS MAJ-210

## (undated) DEVICE — SU VICRYL 3-0 SH 27" J316H

## (undated) DEVICE — SPONGE KITTNER 30-101

## (undated) DEVICE — SYR 10ML LL W/O NDL 302995

## (undated) DEVICE — LINEN GOWN XLG 5407

## (undated) DEVICE — ENDO ADPT BRONCH SWIVEL Y A1002

## (undated) DEVICE — LINEN TOWEL PACK X5 5464

## (undated) DEVICE — PREP CHLORAPREP 26ML TINTED ORANGE  260815

## (undated) DEVICE — SPONGE TONSIL W/STRING MED 23275-680

## (undated) DEVICE — ESU LIGASURE MARYLAND LAPAROSCOPIC SLR/DVDR 5MMX37CM LF1937

## (undated) DEVICE — WIPES FOLEY CARE SURESTEP PROVON DFC100

## (undated) DEVICE — DRAPE MAYO STAND 23X54 8337

## (undated) DEVICE — SUCTION MANIFOLD NEPTUNE 2 SYS 4 PORT 0702-020-000

## (undated) DEVICE — PROTECTOR ARM ONE-STEP TRENDELENBURG 40418

## (undated) DEVICE — ADH SKIN CLOSURE PREMIERPRO EXOFIN 1.0ML 3470

## (undated) DEVICE — CLIP HORIZON SM RED WIDE SLOT 001201

## (undated) DEVICE — LABEL MEDICATION SYSTEM 3303-P

## (undated) DEVICE — SU VICRYL 4-0 PS-2 18" UND J496H

## (undated) DEVICE — CLIP HORIZON MED BLUE 002200

## (undated) DEVICE — KIT ENDO FIRST STEP DISINFECTANT 200ML W/POUCH EP-4

## (undated) DEVICE — BASIN SET SINGLE STERILE 13752-624

## (undated) DEVICE — LUBRICANT INST KIT ENDO-LUBE 220-90

## (undated) DEVICE — ESU ELEC BLADE 2.75" COATED/INSULATED E1455

## (undated) DEVICE — BLADE SAW STERNAL 20X30MM KM-32

## (undated) DEVICE — TUBING PRESSURE M/F CONNECTOR 12" 50P112

## (undated) DEVICE — TUBING SUCTION 10'X3/16" N510

## (undated) DEVICE — DRAPE IOBAN INCISE 23X17" 6650EZ

## (undated) DEVICE — SYR 30ML SLIP TIP W/O NDL 302833

## (undated) DEVICE — DRSG TELFA 3X8" 1238

## (undated) DEVICE — SOL NACL 0.9% IRRIG 1000ML BOTTLE 2F7124

## (undated) DEVICE — CLIP APPLIER ENDO 5MM M/L LIGAMAX EL5ML

## (undated) DEVICE — DRAPE U SPLIT 74X120" 29440

## (undated) DEVICE — ENDO VALVE SUCTION BRONCH EVIS MAJ-209

## (undated) DEVICE — PITCHER STERILE 1000ML  SSK9004A

## (undated) DEVICE — ENDO VALVE SYR NDL KIT ULTRASOUND BRONCH NA-201SX-4022-A

## (undated) DEVICE — ESU GROUND PAD ADULT W/CORD E7507

## (undated) RX ORDER — FENTANYL CITRATE 50 UG/ML
INJECTION, SOLUTION INTRAMUSCULAR; INTRAVENOUS
Status: DISPENSED
Start: 2021-04-05

## (undated) RX ORDER — BUPIVACAINE HYDROCHLORIDE 2.5 MG/ML
INJECTION, SOLUTION EPIDURAL; INFILTRATION; INTRACAUDAL
Status: DISPENSED
Start: 2021-04-05

## (undated) RX ORDER — ALBUTEROL SULFATE 0.83 MG/ML
SOLUTION RESPIRATORY (INHALATION)
Status: DISPENSED
Start: 2021-04-01

## (undated) RX ORDER — PROPOFOL 10 MG/ML
INJECTION, EMULSION INTRAVENOUS
Status: DISPENSED
Start: 2021-02-25

## (undated) RX ORDER — OXYCODONE HYDROCHLORIDE 10 MG/1
TABLET ORAL
Status: DISPENSED
Start: 2021-04-05

## (undated) RX ORDER — CEFAZOLIN SODIUM 2 G/100ML
INJECTION, SOLUTION INTRAVENOUS
Status: DISPENSED
Start: 2021-04-05

## (undated) RX ORDER — LIDOCAINE HYDROCHLORIDE 10 MG/ML
INJECTION, SOLUTION EPIDURAL; INFILTRATION; INTRACAUDAL; PERINEURAL
Status: DISPENSED
Start: 2021-04-05

## (undated) RX ORDER — FENTANYL CITRATE-0.9 % NACL/PF 10 MCG/ML
PLASTIC BAG, INJECTION (ML) INTRAVENOUS
Status: DISPENSED
Start: 2021-02-25

## (undated) RX ORDER — HYDROMORPHONE HYDROCHLORIDE 1 MG/ML
INJECTION, SOLUTION INTRAMUSCULAR; INTRAVENOUS; SUBCUTANEOUS
Status: DISPENSED
Start: 2021-04-05

## (undated) RX ORDER — LIDOCAINE HYDROCHLORIDE 20 MG/ML
INJECTION, SOLUTION EPIDURAL; INFILTRATION; INTRACAUDAL; PERINEURAL
Status: DISPENSED
Start: 2021-02-25

## (undated) RX ORDER — FENTANYL CITRATE 50 UG/ML
INJECTION, SOLUTION INTRAMUSCULAR; INTRAVENOUS
Status: DISPENSED
Start: 2021-02-25

## (undated) RX ORDER — FENTANYL CITRATE 50 UG/ML
INJECTION, SOLUTION INTRAMUSCULAR; INTRAVENOUS
Status: DISPENSED
Start: 2021-03-17

## (undated) RX ORDER — SODIUM CHLORIDE 9 MG/ML
INJECTION, SOLUTION INTRAVENOUS
Status: DISPENSED
Start: 2021-03-17

## (undated) RX ORDER — LIDOCAINE HYDROCHLORIDE 10 MG/ML
INJECTION, SOLUTION EPIDURAL; INFILTRATION; INTRACAUDAL; PERINEURAL
Status: DISPENSED
Start: 2021-03-17